# Patient Record
Sex: MALE | Race: WHITE | NOT HISPANIC OR LATINO | URBAN - METROPOLITAN AREA
[De-identification: names, ages, dates, MRNs, and addresses within clinical notes are randomized per-mention and may not be internally consistent; named-entity substitution may affect disease eponyms.]

---

## 2021-04-15 DIAGNOSIS — Z23 ENCOUNTER FOR IMMUNIZATION: ICD-10-CM

## 2021-04-26 ENCOUNTER — IMMUNIZATIONS (OUTPATIENT)
Dept: FAMILY MEDICINE CLINIC | Facility: HOSPITAL | Age: 36
End: 2021-04-26

## 2021-04-26 DIAGNOSIS — Z23 ENCOUNTER FOR IMMUNIZATION: Primary | ICD-10-CM

## 2021-04-26 PROCEDURE — 91301 SARS-COV-2 / COVID-19 MRNA VACCINE (MODERNA) 100 MCG: CPT

## 2021-04-26 PROCEDURE — 0011A SARS-COV-2 / COVID-19 MRNA VACCINE (MODERNA) 100 MCG: CPT

## 2021-05-24 ENCOUNTER — IMMUNIZATIONS (OUTPATIENT)
Dept: FAMILY MEDICINE CLINIC | Facility: HOSPITAL | Age: 36
End: 2021-05-24

## 2021-05-24 DIAGNOSIS — Z23 ENCOUNTER FOR IMMUNIZATION: Primary | ICD-10-CM

## 2021-05-24 PROCEDURE — 91301 SARS-COV-2 / COVID-19 MRNA VACCINE (MODERNA) 100 MCG: CPT

## 2021-05-24 PROCEDURE — 0012A SARS-COV-2 / COVID-19 MRNA VACCINE (MODERNA) 100 MCG: CPT

## 2021-09-20 ENCOUNTER — ANESTHESIA EVENT (OUTPATIENT)
Dept: OPERATING ROOM | Facility: HOSPITAL | Age: 36
Setting detail: SURGERY ADMIT
End: 2021-09-20
Payer: COMMERCIAL

## 2021-11-26 ENCOUNTER — ANESTHESIA (OUTPATIENT)
Dept: OPERATING ROOM | Facility: HOSPITAL | Age: 36
Setting detail: SURGERY ADMIT
End: 2021-11-26
Payer: COMMERCIAL

## 2022-02-03 ENCOUNTER — TRANSCRIBE ORDERS (OUTPATIENT)
Dept: SCHEDULING | Age: 37
End: 2022-02-03

## 2022-02-03 DIAGNOSIS — Z01.812 ENCOUNTER FOR PREPROCEDURAL LABORATORY EXAMINATION: Primary | ICD-10-CM

## 2022-02-10 ENCOUNTER — APPOINTMENT (OUTPATIENT)
Dept: LAB | Facility: HOSPITAL | Age: 37
End: 2022-02-10
Attending: DENTIST
Payer: COMMERCIAL

## 2022-02-10 DIAGNOSIS — Z01.812 ENCOUNTER FOR PREPROCEDURAL LABORATORY EXAMINATION: ICD-10-CM

## 2022-02-10 LAB — SARS-COV-2 RNA RESP QL NAA+PROBE: NEGATIVE

## 2022-02-10 PROCEDURE — U0003 INFECTIOUS AGENT DETECTION BY NUCLEIC ACID (DNA OR RNA); SEVERE ACUTE RESPIRATORY SYNDROME CORONAVIRUS 2 (SARS-COV-2) (CORONAVIRUS DISEASE [COVID-19]), AMPLIFIED PROBE TECHNIQUE, MAKING USE OF HIGH THROUGHPUT TECHNOLOGIES AS DESCRIBED BY CMS-2020-01-R: HCPCS

## 2022-02-10 PROCEDURE — C9803 HOPD COVID-19 SPEC COLLECT: HCPCS

## 2022-02-11 ENCOUNTER — APPOINTMENT (OUTPATIENT)
Dept: PREADMISSION TESTING | Facility: HOSPITAL | Age: 37
End: 2022-02-11
Payer: COMMERCIAL

## 2022-02-11 VITALS — WEIGHT: 198 LBS | BODY MASS INDEX: 25.41 KG/M2 | HEIGHT: 74 IN

## 2022-02-11 RX ORDER — ESCITALOPRAM OXALATE 10 MG/1
10 TABLET ORAL DAILY
COMMUNITY

## 2022-02-11 RX ORDER — CETIRIZINE HYDROCHLORIDE 10 MG/1
10 TABLET ORAL NIGHTLY
COMMUNITY

## 2022-02-11 ASSESSMENT — PAIN SCALES - GENERAL: PAINLEVEL: 0-NO PAIN

## 2022-02-11 NOTE — PRE-PROCEDURE INSTRUCTIONS
1. Admissions will call you with your arrival time on 2/14  (day prior to surgery) between 2pm - 4pm. For questions about your arrival time, please call [phone].    2. Please report to the [facility] in the [place] on the day of your procedure. Enter the hospital through the [place] [facility] (main entrance at [address], [place]). If you are parking in the [facility], come to the ground floor of the garage and follow signs to the [facility]. Bring your insurance card and photo ID.     3. Please follow these fasting guidelines:  - STOP all solid food 8 hours prior to arrival.   - No more than 12 oz of water is permitted and must STOP 2 HOURS prior to arrival to the hospital.     4. Early on the morning of the procedure, please take the following medications listed below with a sip of water, in addition to any medications prescribed by your surgeon:  Take escitalopram. May take tylenol if needed.     *NO aspirin, ibuprofen, anti-inflammatories, fish oil or Vitamin E unless ordered by physician.    *Stop taking      5. Other instructions: antibacterial shower x 2 dial gold , brush teeth    6. If you develop a cough, cold, fever, or other symptom prior to the date of the procedure, please report it to your physician immediately.    7. If you need to cancel the procedure for any reason, please contact your physician.    8. Make arrangements to have someone drive you home from the procedure. If you have not arranged for transportation home, your surgery may be cancelled.     9. You may not take public transportation or a cab unless accompanied by a responsible person.    10. You may not drive a car or operate complex or potentially dangerous machinery for 24 hours following anesthesia and/or sedation.    11. If it is medically necessary for you to have a longer stay, you will be informed as soon as the decision is made.    12. Do not wear or bring anything of value to the  [facility], including medication or jewelry of any kind. Do not wear make-up or contact lenses. Do bring your glasses and hearing aid, with a case. If you use a CPAP machine and will be overnight, please bring it with you. If you use any inhalers, please bring them as well.     13. Dress in comfortable clothes.    14. If instructed, please bring a copy of your Advance Directive (Living Will/Durable Power of ) on the day of your procedure.    15. Ensuring your safety at all times is a very important part of our [facility] Culture of Safety. After having surgery and sedation, you are at risk for falling and balance issues. Although you may feel awake, the effects of the medication can last up to 24 hours after anesthesia. If you need to use the bathroom during your recovery period, nursing staff will escort you there and stay with you to ensure your safety.    Pre operative instructions given as per protocol.  Form explained by:        Verbalized understanding and denies further questions.

## 2022-02-11 NOTE — NURSING NOTE
Reviewed hx of negative cardiac workup 2016 for palpitations, and pt states he has vasovagal syncope when dehydrated and also that he if prone to passing out during venipunctures with Dr ANNABEL Hollins , no EKG needed.

## 2022-02-14 ASSESSMENT — ENCOUNTER SYMPTOMS: DYSRHYTHMIAS: 1

## 2022-02-14 NOTE — ANESTHESIOLOGIST PRE-PROCEDURE CHART REVIEW
I confirm that I have reviewed the available information in the medical record prior to the scheduled surgery. No labs on chart

## 2022-02-14 NOTE — ANESTHESIA PREPROCEDURE EVALUATION
Relevant Problems   No relevant active problems       Anesthesia ROS/MED HX    Anesthesia History - neg  Pulmonary - neg  Neuro/Psych    Anxiety  Cardiovascular  Dysrhythmias (Palpitations/tachycardia 2016, cardiac workup negative)   Covid19 Test Reviewed  Hematological - neg  GI/Hepatic- neg  Musculoskeletal- neg  Renal Disease- neg  Endo/Other  Body Habitus: Overweight  ROS/MED HX Comments:    Cardiology: Hx vasovagal syncope       Past Surgical History:   Procedure Laterality Date   • WISDOM TOOTH EXTRACTION         Physical Exam    Airway   Mallampati: I   TM distance: >3 FB   Neck ROM: full  Cardiovascular - normal   Rhythm: regular   Rate: normalPulmonary - normal   clear to auscultation  Dental - normal    Anesthesia  Exam Comments:   Dental: Full braces        Anesthesia Plan    Plan: general    Technique: general endotracheal     Lines and Monitors: PIV     Airway: nasal intubation   ASA 2  Blood Products:     Use of Blood Products Discussed: Yes   Anesthetic plan and risks discussed with: patient  Induction:    intravenous   Postop Plan:   Patient Disposition: ICU planned admission   Pain Management: IV analgesics

## 2022-02-15 ENCOUNTER — HOSPITAL ENCOUNTER (INPATIENT)
Facility: HOSPITAL | Age: 37
LOS: 3 days | Discharge: HOME | End: 2022-02-18
Attending: DENTIST | Admitting: DENTIST
Payer: COMMERCIAL

## 2022-02-15 PROBLEM — M26.02 MAXILLARY HYPOPLASIA: Status: ACTIVE | Noted: 2022-02-15

## 2022-02-15 PROBLEM — F41.9 ANXIETY: Status: ACTIVE | Noted: 2022-02-15

## 2022-02-15 LAB
ABO + RH BLD: NORMAL
ABO + RH BLD: NORMAL
BLD GP AB SCN SERPL QL: NEGATIVE
D AG BLD QL: POSITIVE
D AG BLD QL: POSITIVE
LABORATORY COMMENT REPORT: NORMAL
LABORATORY COMMENT REPORT: NORMAL
SPECIMEN EXP DATE BLD: NORMAL

## 2022-02-15 PROCEDURE — 37000001 HC ANESTHESIA GENERAL: Performed by: DENTIST

## 2022-02-15 PROCEDURE — C1758 CATHETER, URETERAL: HCPCS | Performed by: DENTIST

## 2022-02-15 PROCEDURE — 25800000 HC PHARMACY IV SOLUTIONS: Performed by: STUDENT IN AN ORGANIZED HEALTH CARE EDUCATION/TRAINING PROGRAM

## 2022-02-15 PROCEDURE — 25000000 HC PHARMACY GENERAL: Performed by: ANESTHESIOLOGY

## 2022-02-15 PROCEDURE — 36000014 HC OR LEVEL 4 EA ADDL MIN: Performed by: DENTIST

## 2022-02-15 PROCEDURE — 25000000 HC PHARMACY GENERAL: Performed by: DENTIST

## 2022-02-15 PROCEDURE — 25800000 HC PHARMACY IV SOLUTIONS: Performed by: ANESTHESIOLOGY

## 2022-02-15 PROCEDURE — 63600000 HC DRUGS/DETAIL CODE: Performed by: ANESTHESIOLOGY

## 2022-02-15 PROCEDURE — 36415 COLL VENOUS BLD VENIPUNCTURE: CPT | Performed by: DENTIST

## 2022-02-15 PROCEDURE — 71000001 HC PACU PHASE 1 INITIAL 30MIN: Performed by: DENTIST

## 2022-02-15 PROCEDURE — 25800000 HC PHARMACY IV SOLUTIONS: Performed by: DENTIST

## 2022-02-15 PROCEDURE — C1713 ANCHOR/SCREW BN/BN,TIS/BN: HCPCS | Performed by: DENTIST

## 2022-02-15 PROCEDURE — 86901 BLOOD TYPING SEROLOGIC RH(D): CPT

## 2022-02-15 PROCEDURE — 63600000 HC DRUGS/DETAIL CODE: Performed by: STUDENT IN AN ORGANIZED HEALTH CARE EDUCATION/TRAINING PROGRAM

## 2022-02-15 PROCEDURE — 0NSV04Z REPOSITION LEFT MANDIBLE WITH INTERNAL FIXATION DEVICE, OPEN APPROACH: ICD-10-PCS | Performed by: DENTIST

## 2022-02-15 PROCEDURE — 94640 AIRWAY INHALATION TREATMENT: CPT

## 2022-02-15 PROCEDURE — 0NSR04Z REPOSITION MAXILLA WITH INTERNAL FIXATION DEVICE, OPEN APPROACH: ICD-10-PCS | Performed by: DENTIST

## 2022-02-15 PROCEDURE — 27200000 HC STERILE SUPPLY: Performed by: DENTIST

## 2022-02-15 PROCEDURE — 20000000 HC ROOM AND CARE ICU

## 2022-02-15 PROCEDURE — 63700000 HC SELF-ADMINISTRABLE DRUG: Performed by: DENTIST

## 2022-02-15 PROCEDURE — 36000004 HC OR LEVEL 4 INITIAL 30MIN: Performed by: DENTIST

## 2022-02-15 PROCEDURE — 71000011 HC PACU PHASE 1 EA ADDL MIN: Performed by: DENTIST

## 2022-02-15 PROCEDURE — 0NST04Z REPOSITION RIGHT MANDIBLE WITH INTERNAL FIXATION DEVICE, OPEN APPROACH: ICD-10-PCS | Performed by: DENTIST

## 2022-02-15 PROCEDURE — 09SM0ZZ REPOSITION NASAL SEPTUM, OPEN APPROACH: ICD-10-PCS | Performed by: DENTIST

## 2022-02-15 DEVICE — IMPLANTABLE DEVICE: Type: IMPLANTABLE DEVICE | Site: MANDIBLE | Status: FUNCTIONAL

## 2022-02-15 RX ORDER — LIDOCAINE HYDROCHLORIDE 10 MG/ML
INJECTION, SOLUTION INFILTRATION; PERINEURAL AS NEEDED
Status: DISCONTINUED | OUTPATIENT
Start: 2022-02-15 | End: 2022-02-15 | Stop reason: SURG

## 2022-02-15 RX ORDER — GLYCOPYRROLATE 0.6MG/3ML
SYRINGE (ML) INTRAVENOUS AS NEEDED
Status: DISCONTINUED | OUTPATIENT
Start: 2022-02-15 | End: 2022-02-15 | Stop reason: SURG

## 2022-02-15 RX ORDER — DEXAMETHASONE SODIUM PHOSPHATE 4 MG/ML
INJECTION, SOLUTION INTRA-ARTICULAR; INTRALESIONAL; INTRAMUSCULAR; INTRAVENOUS; SOFT TISSUE AS NEEDED
Status: DISCONTINUED | OUTPATIENT
Start: 2022-02-15 | End: 2022-02-15

## 2022-02-15 RX ORDER — DEXMEDETOMIDINE HYDROCHLORIDE 100 UG/ML
INJECTION, SOLUTION INTRAVENOUS AS NEEDED
Status: DISCONTINUED | OUTPATIENT
Start: 2022-02-15 | End: 2022-02-15 | Stop reason: SURG

## 2022-02-15 RX ORDER — ONDANSETRON HYDROCHLORIDE 2 MG/ML
INJECTION, SOLUTION INTRAVENOUS AS NEEDED
Status: DISCONTINUED | OUTPATIENT
Start: 2022-02-15 | End: 2022-02-15 | Stop reason: SURG

## 2022-02-15 RX ORDER — EPHEDRINE SULFATE 50 MG/ML
INJECTION, SOLUTION INTRAVENOUS AS NEEDED
Status: DISCONTINUED | OUTPATIENT
Start: 2022-02-15 | End: 2022-02-15 | Stop reason: SURG

## 2022-02-15 RX ORDER — OXYCODONE HYDROCHLORIDE 5 MG/1
5 TABLET ORAL EVERY 4 HOURS PRN
Status: DISCONTINUED | OUTPATIENT
Start: 2022-02-15 | End: 2022-02-15

## 2022-02-15 RX ORDER — DEXAMETHASONE SODIUM PHOSPHATE 4 MG/ML
2 INJECTION, SOLUTION INTRA-ARTICULAR; INTRALESIONAL; INTRAMUSCULAR; INTRAVENOUS; SOFT TISSUE
Status: COMPLETED | OUTPATIENT
Start: 2022-02-17 | End: 2022-02-17

## 2022-02-15 RX ORDER — SODIUM CHLORIDE 9 MG/ML
INJECTION, SOLUTION INTRAVENOUS CONTINUOUS PRN
Status: DISCONTINUED | OUTPATIENT
Start: 2022-02-15 | End: 2022-02-15 | Stop reason: SURG

## 2022-02-15 RX ORDER — SODIUM CHLORIDE 9 MG/ML
INJECTION, SOLUTION INTRAVENOUS CONTINUOUS
Status: DISCONTINUED | OUTPATIENT
Start: 2022-02-15 | End: 2022-02-17

## 2022-02-15 RX ORDER — FENTANYL CITRATE 50 UG/ML
INJECTION, SOLUTION INTRAMUSCULAR; INTRAVENOUS AS NEEDED
Status: DISCONTINUED | OUTPATIENT
Start: 2022-02-15 | End: 2022-02-15 | Stop reason: SURG

## 2022-02-15 RX ORDER — HYDROMORPHONE HYDROCHLORIDE 1 MG/ML
0.5 INJECTION, SOLUTION INTRAMUSCULAR; INTRAVENOUS; SUBCUTANEOUS
Status: DISCONTINUED | OUTPATIENT
Start: 2022-02-15 | End: 2022-02-15 | Stop reason: HOSPADM

## 2022-02-15 RX ORDER — DEXAMETHASONE SODIUM PHOSPHATE 4 MG/ML
8 INJECTION, SOLUTION INTRA-ARTICULAR; INTRALESIONAL; INTRAMUSCULAR; INTRAVENOUS; SOFT TISSUE
Status: COMPLETED | OUTPATIENT
Start: 2022-02-15 | End: 2022-02-16

## 2022-02-15 RX ORDER — MIDAZOLAM HYDROCHLORIDE 2 MG/2ML
INJECTION, SOLUTION INTRAMUSCULAR; INTRAVENOUS AS NEEDED
Status: DISCONTINUED | OUTPATIENT
Start: 2022-02-15 | End: 2022-02-15 | Stop reason: SURG

## 2022-02-15 RX ORDER — IBUPROFEN 200 MG
16-32 TABLET ORAL AS NEEDED
Status: DISCONTINUED | OUTPATIENT
Start: 2022-02-15 | End: 2022-02-18

## 2022-02-15 RX ORDER — DEXTROSE MONOHYDRATE AND SODIUM CHLORIDE 5; .45 G/100ML; G/100ML
100 INJECTION, SOLUTION INTRAVENOUS CONTINUOUS
Status: DISCONTINUED | OUTPATIENT
Start: 2022-02-15 | End: 2022-02-16

## 2022-02-15 RX ORDER — PHENYLEPHRINE HYDROCHLORIDE 10 MG/ML
INJECTION INTRAVENOUS AS NEEDED
Status: DISCONTINUED | OUTPATIENT
Start: 2022-02-15 | End: 2022-02-15 | Stop reason: SURG

## 2022-02-15 RX ORDER — LABETALOL HCL 20 MG/4 ML
SYRINGE (ML) INTRAVENOUS AS NEEDED
Status: DISCONTINUED | OUTPATIENT
Start: 2022-02-15 | End: 2022-02-15 | Stop reason: SURG

## 2022-02-15 RX ORDER — FENTANYL CITRATE 50 UG/ML
50 INJECTION, SOLUTION INTRAMUSCULAR; INTRAVENOUS
Status: DISCONTINUED | OUTPATIENT
Start: 2022-02-15 | End: 2022-02-15 | Stop reason: HOSPADM

## 2022-02-15 RX ORDER — ROCURONIUM BROMIDE 10 MG/ML
INJECTION, SOLUTION INTRAVENOUS AS NEEDED
Status: DISCONTINUED | OUTPATIENT
Start: 2022-02-15 | End: 2022-02-15 | Stop reason: SURG

## 2022-02-15 RX ORDER — PROPOFOL 10 MG/ML
INJECTION, EMULSION INTRAVENOUS AS NEEDED
Status: DISCONTINUED | OUTPATIENT
Start: 2022-02-15 | End: 2022-02-15 | Stop reason: SURG

## 2022-02-15 RX ORDER — SODIUM CHLORIDE, SODIUM GLUCONATE, SODIUM ACETATE, POTASSIUM CHLORIDE AND MAGNESIUM CHLORIDE 30; 37; 368; 526; 502 MG/100ML; MG/100ML; MG/100ML; MG/100ML; MG/100ML
INJECTION, SOLUTION INTRAVENOUS CONTINUOUS PRN
Status: DISCONTINUED | OUTPATIENT
Start: 2022-02-15 | End: 2022-02-15 | Stop reason: SURG

## 2022-02-15 RX ORDER — DEXTROSE 40 %
15-30 GEL (GRAM) ORAL AS NEEDED
Status: DISCONTINUED | OUTPATIENT
Start: 2022-02-15 | End: 2022-02-18

## 2022-02-15 RX ORDER — AMOXICILLIN 250 MG
1 CAPSULE ORAL 2 TIMES DAILY
Status: DISCONTINUED | OUTPATIENT
Start: 2022-02-15 | End: 2022-02-18

## 2022-02-15 RX ORDER — CHLORHEXIDINE GLUCONATE ORAL RINSE 1.2 MG/ML
SOLUTION DENTAL
Status: DISCONTINUED | OUTPATIENT
Start: 2022-02-15 | End: 2022-02-15 | Stop reason: HOSPADM

## 2022-02-15 RX ORDER — HYDROMORPHONE HYDROCHLORIDE 1 MG/ML
0.4 INJECTION, SOLUTION INTRAMUSCULAR; INTRAVENOUS; SUBCUTANEOUS
Status: DISCONTINUED | OUTPATIENT
Start: 2022-02-15 | End: 2022-02-18

## 2022-02-15 RX ORDER — ONDANSETRON HYDROCHLORIDE 2 MG/ML
4 INJECTION, SOLUTION INTRAVENOUS
Status: DISCONTINUED | OUTPATIENT
Start: 2022-02-15 | End: 2022-02-15 | Stop reason: HOSPADM

## 2022-02-15 RX ORDER — DEXTROSE 50 % IN WATER (D50W) INTRAVENOUS SYRINGE
25 AS NEEDED
Status: DISCONTINUED | OUTPATIENT
Start: 2022-02-15 | End: 2022-02-18

## 2022-02-15 RX ORDER — DEXAMETHASONE SODIUM PHOSPHATE 4 MG/ML
4 INJECTION, SOLUTION INTRA-ARTICULAR; INTRALESIONAL; INTRAMUSCULAR; INTRAVENOUS; SOFT TISSUE
Status: COMPLETED | OUTPATIENT
Start: 2022-02-17 | End: 2022-02-17

## 2022-02-15 RX ORDER — TRIPROLIDINE/PSEUDOEPHEDRINE 2.5MG-60MG
600 TABLET ORAL EVERY 6 HOURS PRN
Status: DISCONTINUED | OUTPATIENT
Start: 2022-02-15 | End: 2022-02-18

## 2022-02-15 RX ORDER — LIDOCAINE HCL/EPINEPHRINE/PF 2%-1:200K
VIAL (ML) INJECTION
Status: DISCONTINUED | OUTPATIENT
Start: 2022-02-15 | End: 2022-02-15 | Stop reason: HOSPADM

## 2022-02-15 RX ORDER — DEXAMETHASONE SODIUM PHOSPHATE 4 MG/ML
6 INJECTION, SOLUTION INTRA-ARTICULAR; INTRALESIONAL; INTRAMUSCULAR; INTRAVENOUS; SOFT TISSUE
Status: COMPLETED | OUTPATIENT
Start: 2022-02-16 | End: 2022-02-16

## 2022-02-15 RX ADMIN — DEXAMETHASONE SODIUM PHOSPHATE 8 MG: 4 INJECTION, SOLUTION INTRA-ARTICULAR; INTRALESIONAL; INTRAMUSCULAR; INTRAVENOUS; SOFT TISSUE at 15:27

## 2022-02-15 RX ADMIN — Medication 0.4 MG: at 09:26

## 2022-02-15 RX ADMIN — LABETALOL HYDROCHLORIDE 5 MG: 5 INJECTION, SOLUTION INTRAVENOUS at 09:04

## 2022-02-15 RX ADMIN — PHENYLEPHRINE HYDROCHLORIDE 50 MCG: 10 INJECTION INTRAVENOUS at 11:28

## 2022-02-15 RX ADMIN — ROCURONIUM BROMIDE 20 MG: 10 INJECTION, SOLUTION INTRAVENOUS at 10:44

## 2022-02-15 RX ADMIN — SODIUM CHLORIDE, SODIUM GLUCONATE, SODIUM ACETATE, POTASSIUM CHLORIDE AND MAGNESIUM CHLORIDE: 526; 502; 368; 37; 30 INJECTION, SOLUTION INTRAVENOUS at 07:50

## 2022-02-15 RX ADMIN — PHENYLEPHRINE HYDROCHLORIDE 50 MCG: 10 INJECTION INTRAVENOUS at 09:56

## 2022-02-15 RX ADMIN — EPHEDRINE SULFATE 10 MG: 50 INJECTION, SOLUTION INTRAVENOUS at 08:19

## 2022-02-15 RX ADMIN — LIDOCAINE HYDROCHLORIDE 4 ML: 10 INJECTION, SOLUTION INFILTRATION; PERINEURAL at 07:41

## 2022-02-15 RX ADMIN — PHENYLEPHRINE HYDROCHLORIDE 50 MCG: 10 INJECTION INTRAVENOUS at 12:10

## 2022-02-15 RX ADMIN — EPHEDRINE SULFATE 2.5 MG: 50 INJECTION, SOLUTION INTRAVENOUS at 09:12

## 2022-02-15 RX ADMIN — ROCURONIUM BROMIDE 10 MG: 10 INJECTION, SOLUTION INTRAVENOUS at 09:32

## 2022-02-15 RX ADMIN — PROPOFOL 200 MG: 10 INJECTION, EMULSION INTRAVENOUS at 07:41

## 2022-02-15 RX ADMIN — PHENYLEPHRINE HYDROCHLORIDE 50 MCG: 10 INJECTION INTRAVENOUS at 10:29

## 2022-02-15 RX ADMIN — HYDROMORPHONE HYDROCHLORIDE 0.4 MG: 1 INJECTION, SOLUTION INTRAMUSCULAR; INTRAVENOUS; SUBCUTANEOUS at 19:54

## 2022-02-15 RX ADMIN — ROCURONIUM BROMIDE 10 MG: 10 INJECTION, SOLUTION INTRAVENOUS at 09:56

## 2022-02-15 RX ADMIN — FENTANYL CITRATE 25 MCG: 50 INJECTION, SOLUTION INTRAMUSCULAR; INTRAVENOUS at 13:55

## 2022-02-15 RX ADMIN — SODIUM CHLORIDE: 0.9 INJECTION, SOLUTION INTRAVENOUS at 07:33

## 2022-02-15 RX ADMIN — ROCURONIUM BROMIDE 50 MG: 10 INJECTION, SOLUTION INTRAVENOUS at 07:43

## 2022-02-15 RX ADMIN — PHENYLEPHRINE HYDROCHLORIDE 100 MCG: 10 INJECTION INTRAVENOUS at 10:33

## 2022-02-15 RX ADMIN — PHENYLEPHRINE HYDROCHLORIDE 100 MCG: 10 INJECTION INTRAVENOUS at 10:12

## 2022-02-15 RX ADMIN — CEFAZOLIN 1 G: 330 INJECTION, POWDER, FOR SOLUTION INTRAMUSCULAR; INTRAVENOUS at 12:00

## 2022-02-15 RX ADMIN — FENTANYL CITRATE 25 MCG: 50 INJECTION, SOLUTION INTRAMUSCULAR; INTRAVENOUS at 12:29

## 2022-02-15 RX ADMIN — ROCURONIUM BROMIDE 30 MG: 10 INJECTION, SOLUTION INTRAVENOUS at 08:43

## 2022-02-15 RX ADMIN — FENTANYL CITRATE 100 MCG: 50 INJECTION, SOLUTION INTRAMUSCULAR; INTRAVENOUS at 07:41

## 2022-02-15 RX ADMIN — PHENYLEPHRINE HYDROCHLORIDE 50 MCG: 10 INJECTION INTRAVENOUS at 11:23

## 2022-02-15 RX ADMIN — PHENYLEPHRINE HYDROCHLORIDE 100 MCG: 10 INJECTION INTRAVENOUS at 10:48

## 2022-02-15 RX ADMIN — DEXMEDETOMIDINE HYDROCHLORIDE 30 MCG: 100 INJECTION, SOLUTION INTRAVENOUS at 08:25

## 2022-02-15 RX ADMIN — AMPICILLIN SODIUM AND SULBACTAM SODIUM 3 G: 2; 1 INJECTION, POWDER, FOR SOLUTION INTRAMUSCULAR; INTRAVENOUS at 16:29

## 2022-02-15 RX ADMIN — CEFAZOLIN 2 G: 330 INJECTION, POWDER, FOR SOLUTION INTRAMUSCULAR; INTRAVENOUS at 08:05

## 2022-02-15 RX ADMIN — DEXAMETHASONE SODIUM PHOSPHATE 8 MG: 4 INJECTION, SOLUTION INTRA-ARTICULAR; INTRALESIONAL; INTRAMUSCULAR; INTRAVENOUS; SOFT TISSUE at 20:07

## 2022-02-15 RX ADMIN — ROCURONIUM BROMIDE 20 MG: 10 INJECTION, SOLUTION INTRAVENOUS at 11:44

## 2022-02-15 RX ADMIN — MIDAZOLAM HYDROCHLORIDE 2 MG: 1 INJECTION, SOLUTION INTRAMUSCULAR; INTRAVENOUS at 07:33

## 2022-02-15 RX ADMIN — ONDANSETRON 4 MG: 2 INJECTION INTRAMUSCULAR; INTRAVENOUS at 12:15

## 2022-02-15 RX ADMIN — DEXMEDETOMIDINE HYDROCHLORIDE 5 MCG: 100 INJECTION, SOLUTION INTRAVENOUS at 11:57

## 2022-02-15 RX ADMIN — SUGAMMADEX 200 MG: 100 INJECTION, SOLUTION INTRAVENOUS at 12:33

## 2022-02-15 RX ADMIN — LABETALOL HYDROCHLORIDE 5 MG: 5 INJECTION, SOLUTION INTRAVENOUS at 09:01

## 2022-02-15 RX ADMIN — PHENYLEPHRINE HYDROCHLORIDE 100 MCG: 10 INJECTION INTRAVENOUS at 10:56

## 2022-02-15 RX ADMIN — FENTANYL CITRATE 25 MCG: 50 INJECTION, SOLUTION INTRAMUSCULAR; INTRAVENOUS at 13:32

## 2022-02-15 RX ADMIN — AMPICILLIN SODIUM AND SULBACTAM SODIUM 3 G: 2; 1 INJECTION, POWDER, FOR SOLUTION INTRAMUSCULAR; INTRAVENOUS at 20:22

## 2022-02-15 RX ADMIN — DEXTROSE AND SODIUM CHLORIDE 100 ML/HR: 5; 450 INJECTION, SOLUTION INTRAVENOUS at 14:57

## 2022-02-15 RX ADMIN — DEXAMETHASONE SODIUM PHOSPHATE 10 MG: 4 INJECTION, SOLUTION INTRAMUSCULAR; INTRAVENOUS at 08:03

## 2022-02-15 NOTE — OP NOTE
Pre op Dx: Dentofacial deformity and malocclusion, Maxillary Hyperplasia, mandibular hypoplasia.    Post op Dx: Same    Anes: GNTA    Surgeon: Aleksandr Tracy, NIKHIL Chavez MD, DMD    Procedure: Lefort I midface osteotomy with advancement and impaction, Nasal Septoplasty, Bilateral Inferior Turbinectomy, Bilateral Sagital Split Mandibular Osteotomy    EBL: 350 cc    IVF: 2700 cc    Urine: 500 cc    Spec: None    Procedure in detail:    Patient was taken to the operating room and placed on the operating room table in the supine position.  After adequate induction of general anesthesia the patient was prepped and draped in the usual sterile fashion for maxillofacial procedure.  At this point the bilateral alar bases were marked utilizing a 4-0 silk suture and the alar base width was marked as 36 mm preoperatively.  Attention was directed intraorally where the mouth and oropharynx were thoroughly suctioned and a moist throat pack was placed.  Utilizing 2% Xylocaine with 1 100,000 epinephrine the patient was injected via bilateral V3 block and buccal and palatal infiltrations into the maxilla as well as into the anterior mandible.    Utilizing the Bovie electrocautery, an incision was made over the external oblique ridge on the left mandible carried anteriorly to the distal aspect of tooth #18.  A vertical release was then made on the distal aspect of tooth #20.  Utilizing a periosteal elevator the tissues were raised in a subperiosteal plane to expose the lateral aspect of the mandible down to the inferior border.  The dissection continued in a subperiosteal plane on the medial aspect of the mandibular ramus.  Section continued up the ramus utilizing a notch stripper where the fibers of the temporalis muscle insertion on the coronoid process per lysed utilizing the Bovie electrocautery.  A curved Kocher and chain were placed as a retractor on the coronoid process.  At this point the inferior alveolar  nerve was identified entering the mandibular foramen on the medial aspect of the ramus.  A horizontal monocortical bony cut was made utilizing a reciprocating saw at this point.  The reciprocating saw was then used to make a vertical cut in a monocortical fashion on the buccal cortex of the left mandible in the first molar region.  2 previously mentioned bony cuts were then connected utilizing a sagittal saw.  At this point the surgical site was packed with Surgicel.    Utilizing the Bovie electrocautery, an incision was made over the external oblique ridge on the right mandible carried anteriorly to the distal aspect of tooth # 31.  A vertical release was then made on the distal aspect of tooth # 29.  Utilizing a periosteal elevator the tissues were raised in a subperiosteal plane to expose the lateral aspect of the mandible down to the inferior border.  The dissection continued in a subperiosteal plane on the medial aspect of the mandibular ramus.  Section continued up the ramus utilizing a notch stripper where the fibers of the temporalis muscle insertion on the coronoid process per lysed utilizing the Bovie electrocautery.  A curved Kocher and chain were placed as a retractor on the coronoid process.  At this point the inferior alveolar nerve was identified entering the mandibular foramen on the medial aspect of the ramus.  A horizontal monocortical bony cut was made utilizing a reciprocating saw at this point.  The reciprocating saw was then used to make a vertical cut in a monocortical fashion on the buccal cortex of the right mandible in the first molar region.  2 previously mentioned bony cuts were then connected utilizing a sagittal saw.  At this point the surgical site was packed with Surgicel.    Attention was then directed to the maxilla where utilizing the Bovie electrocautery a vestibular incision was made from first premolar to first premolar on the opposite side.  The incision was continued down  through periosteum.  The tissues were then raised in a subperiosteal plane to expose the entire anterior face of the maxilla.  Dissection continued around the piriform aperture raising the nasal mucosa off of the surrounding nasal floor, lateral nasal walls.  The dissection continued in a subperiosteal plane around the zygomaticomaxillary buttresses bilaterally to the pterygoid plates.  At this point utilizing the reciprocating saw a horizontal cut was made through the bilateral zygomaticomaxillary buttresses across the anterior face of the maxilla through the piriform aperture approximately 5 mm superior to the nasal floor.  Utilizing a nasal septal osteotome, the nasal septum was osteotomized from the nasal floor.  A single ball osteotome was then used to osteotomize the lateral nasal walls.  A curved pterygoid chisel was then used to perform the pterygomaxillary disjunction.  At this point the maxilla was down fractured utilizing manual finger pressure.  Maxilla was then mobilized utilizing Sanches disimpaction forceps.  The bony nasal floor midline was flattened utilizing a rongeur.  The lateral nasal walls were also removed from the floor of the maxilla utilizing the rongeur. A submucous septoplasty was performed with a #15 blde to ensure passive fit to the nasal floor with no deflection of the septum. An inferior turbinectomy was performed by resection of the inferior aspect of the inferior turbinate with the bovie electrocautery and the bone of the turbinate was trimmed with a rongeur.  There were several small tears noted in the nasal mucosa on the both sides.  This mucosa was repaired with 4-0 chromic suture in an interrupted fashion. An enameloplasty was performed with the drill and a round bur to teeth #4, 13 palatal cusps and buccal cusp of #20.  At this point the intermediate splint was then wired to between the maxilla and the mandible. A 4mm segment of bone was removed from the midface just superior to  the cut to allow adequate impaction of the maxilla. All bony interferences were removed with a rongeur and the impaction measurement was confirmed.    The maxilla was then secured with rigid fixation utilizing 4 L-shaped bone plates, 2.0 mm KLS Kyree with multiple screws.  At this point the maxillomandibular fixation was removed.  Attention was then directed back down to the mandibular osteotomy cuts where utilizing various osteotomes and chisels the above-mentioned mandibular cuts were then completed.  The bilateral sagittal split osteotomy was then completed with a Roche .  It should be noted that the inferior alveolar nerves were completely intact bilaterally.  At this point, the final surgical splint was then wired to the maxilla with 26-gauge stainless steel surgical wire.  The patient was then placed into maxillomandibular fixation utilizing 26-gauge stainless steel surgical wire.  Attention was directed to the patient's left side where utilizing the [place] bone clamp, the proximal segment was secured and clamped to the distal segment after seating the condyle into the fossa.  A #11 blade was utilized to make a small stab incision in the left cheek.  The transbuccal trocar and cheek retractor was then used to place a total of three 2.0 mm KLS Kyree bicortical screws to secure the mandible. Attention was directed to the patient's right side where utilizing the [place] bone clamp, the proximal segment was secured and clamped to the distal segment after seating the condyle into the fossa.  A #11 blade was utilized to make a small stab incision in the right cheek.  The transbuccal trocar and cheek retractor was then used to place a total of three 2.0 mm KLS Kyree bicortical screws to secure the mandible. The condyle was then seated into its proper position and 3 screws were placed to rigidly fixate the mandible on the right side.    At this point the maxillomandibular fixation was removed and the  dental occlusion was checked and found to be excellent and repeatable. The nasal septum was secured to the midline of the Anterior Nasal Spine with 2-0 PDS. Attention was then directed to the maxilla where an alar base cinch suture was utilized to reestablish the alar base width.  This area was secured with 2-0 PDS suture.  The maxillary incision was then closed with 3-0 chromic suture in a VY fashion.  These wounds were then closed with multiple interrupted 3-0 chromic sutures.  The external facial incisions on the cheeks were then closed with interrupted 5-0 Prolene sutures.    The mouth and oropharynx were thoroughly suctioned out and a nasogastric tube was passed and suctioned and removed.  The maxilla and mandible were wired together in the surgical splint utilizing 26-gauge stainless steel surgical wire.  This marked the completion of the procedure. A jaw bra pressure dressing was then placed.  The patient was then awakened from the general anesthetic and extubated in the operating room.  She was then transferred to the postanesthesia care unit with stable vital signs and spontaneous ventilations.

## 2022-02-15 NOTE — ASSESSMENT & PLAN NOTE
S/p Maxillofacial surgery    Post surgical management w/ OMF  Post op abx, Unasyn course inpatient and transition to PO abx on d/c per OMF  Have wire cutters at bedside  S/p Dexamethasone taper. monitor  Monitor respiratory status and airway  Dilaudid PRN  Suction PRN  OOB/ambulation  Diet, CLD and advance diet per OMF recommendations  Appropriate dvt ppx

## 2022-02-15 NOTE — ANESTHESIA POSTPROCEDURE EVALUATION
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])    Surgeons: Aleksandr Tracy DMD Responsible Provider: Isaura Squires MD    Anesthesia Type: general ASA Status: 2          Anesthesia Type: general  PACU Vitals  2/15/2022 1305 - 2/15/2022 1318      2/15/2022  1310             BP: 126/60    Temp: 37.1 °C (98.8 °F)    Pulse: 97    Resp: 23    SpO2: 100 %            Anesthesia Post Evaluation    Pain management: satisfactory to patient  Mode of pain management: IV medication  Patient location during evaluation: PACU  Patient participation: complete - patient cannot participate  Level of consciousness: awake and arousable  Cardiovascular status: hemodynamically stable  Airway Patency: patent  Respiratory status: nonlabored ventilation, spontaneous ventilation and face mask  Hydration status: stable  Anesthetic complications: no

## 2022-02-15 NOTE — PROGRESS NOTES
Critical Care Progress Note    Hospital Day 1  Vent Day N/A    INTERVAL SUMMARY    Patient presenting to ICU after following procedure for dentofacial deformity/malocclusion, Maxillary hyperplasia, Mandibular hypoplasia:  Lefort I midface osteotomy with advancement and impaction, Nasal Septoplasty, Bilateral Inferior Turbinectomy, Bilateral Sagital Split Mandibular Osteotomy    Procedure uncomplicated. Patient admitted chiefly for airway monitoring.     On examination, patient responds by nodding.     Per nursing report, patient can undergo vasovagal syncope 2/2 anxiety.     OBJECTIVE   VITAL SIGNS  Temp:  [36.3 °C (97.4 °F)-37.1 °C (98.8 °F)] 36.5 °C (97.7 °F)  Heart Rate:  [77-98] 90  Resp:  [12-22] 14  BP: (121-130)/(58-75) 129/72  FiO2 (%) (Set):  [60 %-100 %] 60 %  FiO2 (%) (Set):  [60 %-100 %] 60 %    Intake/Output Summary (Last 24 hours) at 2/15/2022 1620  Last data filed at 2/15/2022 1500  Gross per 24 hour   Intake 2825 ml   Output 1250 ml   Net 1575 ml       O2 Requirement:  Oxygen Therapy: Supplemental oxygen  O2 Delivery Method: Face tent (humidified)  FiO2 (%) (Set): 60 %  O2 Flow Rate (L/min): 12 L/min       MEDICATIONS  ampicillin-sulbactam, 3 g, q6h INT  dexamethasone, 8 mg, q6h INT   Followed by  [START ON 2/16/2022] dexamethasone, 6 mg, q6h INT   Followed by  [START ON 2/17/2022] dexamethasone, 4 mg, q6h INT   Followed by  [START ON 2/17/2022] dexamethasone, 2 mg, q6h INT  sennosides-docusate sodium, 1 tablet, BID           LAB RESULTS  Recent Results (from the past 24 hour(s))   Type and Screen [facility]    Collection Time: 02/15/22  6:44 AM   Result Value Ref Range    Specimen Expiration 02/18/2022     Antibody Screen Negative     ABO A     Rh Factor Positive     History Check No type on file    Repeat ABO/Rh (Type Check)    Collection Time: 02/15/22  7:10 AM   Result Value Ref Range    ABO A     Rh Factor Positive     History Check Previous type on file      Laboratory results were independently  reviewed    ABG          CULTURES  Microbiology Results       Procedure Component Value Units Date/Time    COVID-19 PCR PAT/Pre-procedural [[id number]]  (Normal) Collected: 02/10/22 1032    Specimen: Nasopharyngeal Swab from Nasopharynx Updated: 02/10/22 1755    Narrative:      The following orders were created for panel order COVID-19 PCR PAT/Pre-procedural.  Procedure                               Abnormality         Status                     ---------                               -----------         ------                     SARS-CoV-2 (COVID-19), PCR[[id number]]   Normal              Final result                 Please view results for these tests on the individual orders.    SARS-CoV-2 (COVID-19), PCR [[id number]]  (Normal) Collected: 02/10/22 1032    Specimen: Nasopharyngeal Swab from Nasopharynx Updated: 02/10/22 1755     SARS-CoV-2 (COVID-19) Negative            Laboratory results were independently reviewed    RADIOLOGY  No results found.  Radiography was independently reviewed.      Telemetry: sinus rhythm    VTE Risk Assessment and Plan  Current anticoagulants:    None          GI prophylaxis  None      PHYSICAL EXAM    GEN: Uncomfortable appearing  HENT: NC/AT, no deformity  NECK: supple, no lymphadenopathy  CARD: RRR, no rubs, murmur, gallops  RESP: CTA, no wheeze, rales, crackles  Gi: soft, NTND, +BS  EXT: no edema, pulses present  SKIN: warm, dry, no rash  NEURO: AAOx3, nonfocal        ASSESSMENT & PLAN    Anxiety  Assessment & Plan  Continue lexapro    * Maxillary hypoplasia  Assessment & Plan  S/p Maxillofacial surgery    CXR for airway check in AM  Dexamethasone taper  D5 with 1/2 NS IVF  Dilaudid PRN      Acting as a scribe in the presence of Dr. Rajesh Hickey DO  I have reviewed and agree with the above note.  Rajesh Vasquez MD  [id number]  2/16/2022  8:52 AM

## 2022-02-15 NOTE — ANESTHESIA PROCEDURE NOTES
Airway  Urgency: elective    Start Time: 2/15/2022 7:46 AM  Airway not difficult    General Information and Staff    Patient location during procedure: OR  Anesthesiologist: Isaura Squires MD  Performed: anesthesiologist     Indications and Patient Condition  Indications for airway management: anesthesia  Sedation level: general  Preoxygenated: yes  Patient position: sniffing  Mask difficulty assessment: 2 - vent by mask + OA or adjuvant +/- NMBA (Nasal airway used for dilation)    Final Airway Details  Final airway type: endotracheal airway      Successful airway: SHASHA tube  Cuffed: yes   Successful intubation technique: direct laryngoscopy  Facilitating devices/methods: NPA and anterior pressure/BURP  Endotracheal tube insertion site: right naris  Blade: Tu  Blade size: #4  Cormack-Lehane Classification: grade I - full view of glottis  Placement verified by: chest auscultation and capnometry   Measured from: nares  ETT to nares (cm): 29  Number of attempts at approach: 1  Number of other approaches attempted: 0  Atraumatic airway insertion      Additional Comments  BMV with sequential 30, 32, and 34 Fr nasal airways in R nare to dilate.  R nare selected because nasal airway passed easier through it than L.  7.5 nasal SHASHA tube softened in warm water, then lubricated.  1 mL phenylephrine squirted into R nare and then SHASHA tube inserted with minimal resistance.  DL with MAC 3, grade 1 view with slight cricoid pressure.  Secretions and small amount of blood suctioned.  ETT advanced under direct visualization, went through cords without needing Kay forceps.

## 2022-02-15 NOTE — ANESTHESIOLOGIST PRE-PROCEDURE ATTESTATION
Pre-Procedure Patient Identification:  I am the Primary Anesthesiologist and have identified the patient on 02/15/22 at 7:12 AM.   I have confirmed the procedure(s) will be performed by the following surgeon/proceduralist Aleksandr Tracy DMD.

## 2022-02-15 NOTE — BRIEF OP NOTE
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  Patient underwent maxillary advancement and impaction with mandibular set back.    Estimated Blood Loss: 350 mL    Specimens:                Order Name Source Comment Collection Info Order Time   TYPE AND SCREEN Blood, Venous  Collected By: Roberta Burch RN 2/15/2022  6:43 AM     Are you aware of this patient having previously identified antibodies?   NO          Does this Patient have Sickle Cell Disease/Sickle Cell Anemia?   NO          Release to patient   Immediate        REPEAT ABO/RH (TYPE CHECK) Blood, Venous  Collected By: Elle Jesus RN 2/15/2022  7:05 AM     Release to patient   Immediate              Drains:   [REMOVED] Urethral Catheter Latex 16 Fr (Removed)       Implants:   Implant Name Type Inv. Item Serial No.  Lot No. LRB No. Used Action   Level one CMF plate, L SHP, right    ROM MCMAHAN  Right 2 Implanted   Level one CMF plate, L SHP, Left    ROM MCMAHAN  Left 2 Implanted   SCREW LEVEL ONE CMF 2.0 X 5MM - [id number]  SCREW LEVEL ONE CMF 2.0 X 5MM  ROM MCMAHAN  N/A 15 Implanted   SCREW LEVEL ONE CMF 2.0 X 5MM - [id number]  SCREW LEVEL ONE CMF 2.0 X 5MM  ROM MCMAHAN  Right 1 Wasted   SCREW MAXDRIVE  MINI 2.0MMX13 - [id number]  SCREW MAXDRIVE MINI 2.0MMX13  KLS MARJ  N/A 4 Implanted   SCREW MAXDRIVE MINI 2.0MMX11 - [id number]  SCREW MAXDRIVE MINI 2.0MMX11  KLS MARJ  N/A 1 Implanted   Max drive mini screw 2.0x15    KLS MARJ  N/A 1 Implanted              Complications:  None; patient tolerated the procedure well.           Disposition: PACU - hemodynamically stable. Patient to go to ICU later           Condition: stable    Tyrell Chavez MD [facility]  [phone]

## 2022-02-15 NOTE — PERIOPERATIVE NURSING NOTE
Left message on wife's cell phone at this time with update.  Waiting for ICU nurse to call backKerry.  JUDAH, medicated with 25mcg of fentanyl for pain, smaller dose given.  Patient sleeping at this time, JUDAH. HOB elevated, wire cutters taped on left side head of bed.

## 2022-02-16 ENCOUNTER — APPOINTMENT (INPATIENT)
Dept: RADIOLOGY | Facility: HOSPITAL | Age: 37
End: 2022-02-16
Attending: DENTIST
Payer: COMMERCIAL

## 2022-02-16 ENCOUNTER — APPOINTMENT (INPATIENT)
Dept: RADIOLOGY | Facility: HOSPITAL | Age: 37
End: 2022-02-16
Attending: STUDENT IN AN ORGANIZED HEALTH CARE EDUCATION/TRAINING PROGRAM
Payer: COMMERCIAL

## 2022-02-16 LAB
ALBUMIN SERPL-MCNC: 3.8 G/DL (ref 3.4–5)
ALP SERPL-CCNC: 59 IU/L (ref 35–126)
ALT SERPL-CCNC: 28 IU/L (ref 16–63)
ANION GAP SERPL CALC-SCNC: 9 MEQ/L (ref 3–15)
AST SERPL-CCNC: 27 IU/L (ref 15–41)
BASOPHILS # BLD: 0.02 K/UL (ref 0.01–0.1)
BASOPHILS NFR BLD: 0.1 %
BILIRUB SERPL-MCNC: 0.6 MG/DL (ref 0.3–1.2)
BUN SERPL-MCNC: 12 MG/DL (ref 8–20)
CALCIUM SERPL-MCNC: 8.6 MG/DL (ref 8.9–10.3)
CHLORIDE SERPL-SCNC: 104 MEQ/L (ref 98–109)
CO2 SERPL-SCNC: 24 MEQ/L (ref 22–32)
CREAT SERPL-MCNC: 0.7 MG/DL (ref 0.8–1.3)
DIFFERENTIAL METHOD BLD: ABNORMAL
EOSINOPHIL # BLD: 0 K/UL (ref 0.04–0.54)
EOSINOPHIL NFR BLD: 0 %
ERYTHROCYTE [DISTWIDTH] IN BLOOD BY AUTOMATED COUNT: 11.9 % (ref 11.6–14.4)
GFR SERPL CREATININE-BSD FRML MDRD: >60 ML/MIN/1.73M*2
GLUCOSE SERPL-MCNC: 145 MG/DL (ref 70–99)
HCT VFR BLDCO AUTO: 36.8 % (ref 40.1–51)
HGB BLD-MCNC: 12.2 G/DL (ref 13.7–17.5)
IMM GRANULOCYTES # BLD AUTO: 0.11 K/UL (ref 0–0.08)
IMM GRANULOCYTES NFR BLD AUTO: 0.6 %
LYMPHOCYTES # BLD: 0.7 K/UL (ref 1.2–3.5)
LYMPHOCYTES NFR BLD: 3.5 %
MAGNESIUM SERPL-MCNC: 2 MG/DL (ref 1.8–2.5)
MCH RBC QN AUTO: 29.4 PG (ref 28–33.2)
MCHC RBC AUTO-ENTMCNC: 33.2 G/DL (ref 32.2–36.5)
MCV RBC AUTO: 88.7 FL (ref 83–98)
MONOCYTES # BLD: 1.16 K/UL (ref 0.3–1)
MONOCYTES NFR BLD: 5.9 %
NEUTROPHILS # BLD: 17.78 K/UL (ref 1.7–7)
NEUTS SEG NFR BLD: 89.9 %
NRBC BLD-RTO: 0 %
PDW BLD AUTO: 9.7 FL (ref 9.4–12.4)
PLATELET # BLD AUTO: 241 K/UL (ref 150–350)
POTASSIUM SERPL-SCNC: 4.2 MEQ/L (ref 3.6–5.1)
PROT SERPL-MCNC: 7 G/DL (ref 6–8.2)
RBC # BLD AUTO: 4.15 M/UL (ref 4.5–5.8)
SODIUM SERPL-SCNC: 137 MEQ/L (ref 136–144)
WBC # BLD AUTO: 19.77 K/UL (ref 3.8–10.5)

## 2022-02-16 PROCEDURE — 25800000 HC PHARMACY IV SOLUTIONS: Performed by: STUDENT IN AN ORGANIZED HEALTH CARE EDUCATION/TRAINING PROGRAM

## 2022-02-16 PROCEDURE — 80053 COMPREHEN METABOLIC PANEL: CPT | Performed by: DENTIST

## 2022-02-16 PROCEDURE — 36415 COLL VENOUS BLD VENIPUNCTURE: CPT | Performed by: DENTIST

## 2022-02-16 PROCEDURE — 83735 ASSAY OF MAGNESIUM: CPT | Performed by: STUDENT IN AN ORGANIZED HEALTH CARE EDUCATION/TRAINING PROGRAM

## 2022-02-16 PROCEDURE — 20000000 HC ROOM AND CARE ICU

## 2022-02-16 PROCEDURE — 94640 AIRWAY INHALATION TREATMENT: CPT

## 2022-02-16 PROCEDURE — 63700000 HC SELF-ADMINISTRABLE DRUG: Performed by: DENTIST

## 2022-02-16 PROCEDURE — 25800000 HC PHARMACY IV SOLUTIONS: Performed by: DENTIST

## 2022-02-16 PROCEDURE — 71045 X-RAY EXAM CHEST 1 VIEW: CPT

## 2022-02-16 PROCEDURE — 85025 COMPLETE CBC W/AUTO DIFF WBC: CPT | Performed by: DENTIST

## 2022-02-16 PROCEDURE — 70150 X-RAY EXAM OF FACIAL BONES: CPT

## 2022-02-16 PROCEDURE — 63600000 HC DRUGS/DETAIL CODE: Performed by: STUDENT IN AN ORGANIZED HEALTH CARE EDUCATION/TRAINING PROGRAM

## 2022-02-16 RX ORDER — DEXTROSE, SODIUM CHLORIDE, SODIUM LACTATE, POTASSIUM CHLORIDE, AND CALCIUM CHLORIDE 5; .6; .31; .03; .02 G/100ML; G/100ML; G/100ML; G/100ML; G/100ML
INJECTION, SOLUTION INTRAVENOUS CONTINUOUS
Status: DISCONTINUED | OUTPATIENT
Start: 2022-02-16 | End: 2022-02-18

## 2022-02-16 RX ORDER — CHLORHEXIDINE GLUCONATE ORAL RINSE 1.2 MG/ML
15 SOLUTION DENTAL 2 TIMES DAILY
Status: DISCONTINUED | OUTPATIENT
Start: 2022-02-16 | End: 2022-02-18

## 2022-02-16 RX ORDER — ACETAMINOPHEN 650 MG/20.3ML
650 LIQUID ORAL EVERY 4 HOURS PRN
Status: DISCONTINUED | OUTPATIENT
Start: 2022-02-16 | End: 2022-02-17

## 2022-02-16 RX ADMIN — SODIUM CHLORIDE: 9 INJECTION, SOLUTION INTRAVENOUS at 09:01

## 2022-02-16 RX ADMIN — CHLORHEXIDINE GLUCONATE 0.12% ORAL RINSE 15 ML: 1.2 LIQUID ORAL at 08:07

## 2022-02-16 RX ADMIN — AMPICILLIN SODIUM AND SULBACTAM SODIUM 3 G: 2; 1 INJECTION, POWDER, FOR SOLUTION INTRAMUSCULAR; INTRAVENOUS at 13:21

## 2022-02-16 RX ADMIN — SODIUM CHLORIDE 500 ML: 9 INJECTION, SOLUTION INTRAVENOUS at 13:40

## 2022-02-16 RX ADMIN — CHLORHEXIDINE GLUCONATE 0.12% ORAL RINSE 15 ML: 1.2 LIQUID ORAL at 20:27

## 2022-02-16 RX ADMIN — SODIUM CHLORIDE 500 ML: 9 INJECTION, SOLUTION INTRAVENOUS at 14:48

## 2022-02-16 RX ADMIN — SODIUM CHLORIDE: 9 INJECTION, SOLUTION INTRAVENOUS at 09:57

## 2022-02-16 RX ADMIN — DEXAMETHASONE SODIUM PHOSPHATE 6 MG: 4 INJECTION, SOLUTION INTRA-ARTICULAR; INTRALESIONAL; INTRAMUSCULAR; INTRAVENOUS; SOFT TISSUE at 08:04

## 2022-02-16 RX ADMIN — DEXAMETHASONE SODIUM PHOSPHATE 6 MG: 4 INJECTION, SOLUTION INTRA-ARTICULAR; INTRALESIONAL; INTRAMUSCULAR; INTRAVENOUS; SOFT TISSUE at 13:17

## 2022-02-16 RX ADMIN — DOCUSATE SODIUM AND SENNOSIDES 1 TABLET: 50; 8.6 TABLET ORAL at 20:51

## 2022-02-16 RX ADMIN — AMPICILLIN SODIUM AND SULBACTAM SODIUM 3 G: 2; 1 INJECTION, POWDER, FOR SOLUTION INTRAMUSCULAR; INTRAVENOUS at 20:08

## 2022-02-16 RX ADMIN — SODIUM CHLORIDE, SODIUM LACTATE, POTASSIUM CHLORIDE, CALCIUM CHLORIDE AND DEXTROSE MONOHYDRATE: 5; 600; 310; 30; 20 INJECTION, SOLUTION INTRAVENOUS at 10:37

## 2022-02-16 RX ADMIN — SODIUM CHLORIDE 500 ML: 9 INJECTION, SOLUTION INTRAVENOUS at 17:50

## 2022-02-16 RX ADMIN — SODIUM CHLORIDE: 9 INJECTION, SOLUTION INTRAVENOUS at 10:38

## 2022-02-16 RX ADMIN — DEXAMETHASONE SODIUM PHOSPHATE 6 MG: 4 INJECTION, SOLUTION INTRA-ARTICULAR; INTRALESIONAL; INTRAMUSCULAR; INTRAVENOUS; SOFT TISSUE at 20:51

## 2022-02-16 RX ADMIN — AMPICILLIN SODIUM AND SULBACTAM SODIUM 3 G: 2; 1 INJECTION, POWDER, FOR SOLUTION INTRAMUSCULAR; INTRAVENOUS at 08:03

## 2022-02-16 RX ADMIN — IBUPROFEN 600 MG: 100 SUSPENSION ORAL at 13:47

## 2022-02-16 RX ADMIN — DEXTROSE AND SODIUM CHLORIDE 100 ML/HR: 5; 450 INJECTION, SOLUTION INTRAVENOUS at 04:22

## 2022-02-16 RX ADMIN — AMPICILLIN SODIUM AND SULBACTAM SODIUM 3 G: 2; 1 INJECTION, POWDER, FOR SOLUTION INTRAMUSCULAR; INTRAVENOUS at 02:16

## 2022-02-16 RX ADMIN — HYDROMORPHONE HYDROCHLORIDE 0.4 MG: 1 INJECTION, SOLUTION INTRAMUSCULAR; INTRAVENOUS; SUBCUTANEOUS at 03:26

## 2022-02-16 RX ADMIN — DEXAMETHASONE SODIUM PHOSPHATE 8 MG: 4 INJECTION, SOLUTION INTRA-ARTICULAR; INTRALESIONAL; INTRAMUSCULAR; INTRAVENOUS; SOFT TISSUE at 02:17

## 2022-02-16 RX ADMIN — SODIUM CHLORIDE 500 ML/HR: 9 INJECTION, SOLUTION INTRAVENOUS at 04:23

## 2022-02-16 NOTE — PLAN OF CARE
Problem: Adult Inpatient Plan of Care  Goal: Readiness for Transition of Care  Outcome: Progressing  Intervention: Mutually Develop Transition Plan  Flowsheets (Taken 2/16/2022 9725)  Anticipated Discharge Disposition: home with assistance  Equipment Needed After Discharge: none  Assistive Device/Animal Currently Used at Home: none  Transportation Concerns: car, none  Readmission Within the Last 30 Days: no previous admission in last 30 days  Patient/Family Anticipated Services at Transition: none  Patient/Family Anticipates Transition to: home with family  Transportation Anticipated: family or friend will provide  Concerns to be Addressed: no discharge needs identified   Spoke with Pt and wife at beside. Pt admitted s/p [place] Procedure. Wife confirmed address, pharmacy, and PCP. Pt lives in 2 story house with 2 steps to enter, CT on 1st, 2nd full bath, Pt is independent with ADL's , uses no assistive equipment or medical services in home. Denies SNF, Acute rehab or home health. Wife has list of instructions for supplemental feeding while on liquid diet.    Discharge Plan     Home with family

## 2022-02-16 NOTE — PROGRESS NOTES
Critical Care Progress Note    Hospital Day 2  Vent Day N/A    INTERVAL SUMMARY    No overnight events. Patient to be transferred to [facility].   Patient reports pain is mild on examination. Patient noted to have wheezing on examination, most prominent with tracheal auscultation, possibly 2/2 from aspiration from surgical site. Sitting patient upright and having him perform swallowing maneuver reduced wheezing and leaving an inspiratory stridor. Contacting Dr. Tracy affirmed it is OK to suction patient, but suction would have to be worked around surgical wiring.    Due to concern of increased shortness of breath, patient continuing to be monitored in ICU.    OBJECTIVE   VITAL SIGNS  Temp:  [36.4 °C (97.5 °F)-38 °C (100.4 °F)] 36.9 °C (98.4 °F)  Heart Rate:  [68-98] 81  Resp:  [9-22] 14  BP: (117-131)/(58-74) 126/73  FiO2 (%) (Set):  [40 %-100 %] 40 %  FiO2 (%) (Set):  [40 %-100 %] 40 %    Intake/Output Summary (Last 24 hours) at 2/16/2022 1125  Last data filed at 2/16/2022 1100  Gross per 24 hour   Intake 6983.33 ml   Output 5050 ml   Net 1933.33 ml       O2 Requirement:  Oxygen Therapy: Supplemental oxygen  O2 Delivery Method: Face tent (humidified)  FiO2 (%) (Set): 40 %  O2 Flow Rate (L/min): 15 L/min       MEDICATIONS  ampicillin-sulbactam, 3 g, q6h INT  chlorhexidine, 15 mL, BID  dexamethasone, 6 mg, q6h INT   Followed by  [START ON 2/17/2022] dexamethasone, 4 mg, q6h INT   Followed by  [START ON 2/17/2022] dexamethasone, 2 mg, q6h INT  sennosides-docusate sodium, 1 tablet, BID           LAB RESULTS  Recent Results (from the past 24 hour(s))   CBC and differential    Collection Time: 02/16/22  6:04 AM   Result Value Ref Range    WBC 19.77 (H) 3.80 - 10.50 K/uL    RBC 4.15 (L) 4.50 - 5.80 M/uL    Hemoglobin 12.2 (L) 13.7 - 17.5 g/dL    Hematocrit 36.8 (L) 40.1 - 51.0 %    MCV 88.7 83.0 - 98.0 fL    MCH 29.4 28.0 - 33.2 pg    MCHC 33.2 32.2 - 36.5 g/dL    RDW 11.9 11.6 - 14.4 %    Platelets 241 150 - 350 K/uL     MPV 9.7 9.4 - 12.4 fL    Differential Type Auto     nRBC 0.0 <=0.0 %    Immature Granulocytes 0.6 %    Neutrophils 89.9 %    Lymphocytes 3.5 %    Monocytes 5.9 %    Eosinophils 0.0 %    Basophils 0.1 %    Immature Granulocytes, Absolute 0.11 (H) 0.00 - 0.08 K/uL    Neutrophils, Absolute 17.78 (H) 1.70 - 7.00 K/uL    Lymphocytes, Absolute 0.70 (L) 1.20 - 3.50 K/uL    Monocytes, Absolute 1.16 (H) 0.30 - 1.00 K/uL    Eosinophils, Absolute 0.00 (L) 0.04 - 0.54 K/uL    Basophils, Absolute 0.02 0.01 - 0.10 K/uL   Comprehensive metabolic panel    Collection Time: 02/16/22  6:04 AM   Result Value Ref Range    Sodium 137 136 - 144 mEQ/L    Potassium 4.2 3.6 - 5.1 mEQ/L    Chloride 104 98 - 109 mEQ/L    CO2 24 22 - 32 mEQ/L    BUN 12 8 - 20 mg/dL    Creatinine 0.7 (L) 0.8 - 1.3 mg/dL    Glucose 145 (H) 70 - 99 mg/dL    Calcium 8.6 (L) 8.9 - 10.3 mg/dL    AST (SGOT) 27 15 - 41 IU/L    ALT (SGPT) 28 16 - 63 IU/L    Alkaline Phosphatase 59 35 - 126 IU/L    Total Protein 7.0 6.0 - 8.2 g/dL    Albumin 3.8 3.4 - 5.0 g/dL    Bilirubin, Total 0.6 0.3 - 1.2 mg/dL    eGFR >60.0 >=60.0 mL/min/1.73m*2    Anion Gap 9 3 - 15 mEQ/L   Magnesium    Collection Time: 02/16/22  6:04 AM   Result Value Ref Range    Magnesium 2.0 1.8 - 2.5 mg/dL     Laboratory results were independently reviewed    ABG          CULTURES  Microbiology Results       Procedure Component Value Units Date/Time    COVID-19 PCR PAT/Pre-procedural [[id number]]  (Normal) Collected: 02/10/22 1032    Specimen: Nasopharyngeal Swab from Nasopharynx Updated: 02/10/22 5090    Narrative:      The following orders were created for panel order COVID-19 PCR PAT/Pre-procedural.  Procedure                               Abnormality         Status                     ---------                               -----------         ------                     SARS-CoV-2 (COVID-19), PCR[[id number]]   Normal              Final result                 Please view results for these tests on the  individual orders.    SARS-CoV-2 (COVID-19), PCR [[id number]]  (Normal) Collected: 02/10/22 1032    Specimen: Nasopharyngeal Swab from Nasopharynx Updated: 02/10/22 1755     SARS-CoV-2 (COVID-19) Negative            Laboratory results were independently reviewed    RADIOLOGY  No results found.  Radiography was independently reviewed.      Telemetry: sinus rhythm    VTE Risk Assessment and Plan  Current anticoagulants:    None          GI prophylaxis  None      PHYSICAL EXAM    GEN: s/p Maxillofacial surgery  HENT: No active discharge/bleeding seen from mouth  NECK: supple, no lymphadenopathy  CARD: RRR, no rubs, murmur, gallops  RESP: wheezing initially, then inspiratory stridor s/p readjustment  Gi: soft, NTND, +BS  EXT: no edema, pulses present  SKIN: warm, dry, no rash        ASSESSMENT & PLAN    Anxiety  Assessment & Plan  Continue lexapro    * Maxillary hypoplasia  Assessment & Plan  S/p Maxillofacial surgery    Attempt OOB/ambulation  Facial series today  Nutrition c/s  Dexamethasone taper  Dilaudid PRN  Monitor respiratory status  Suction PRN        Acting as a scribe in the presence of Dr. Rajesh Hickey DO  I have reviewed and agree with the above note.  Rajesh Vasquez MD  [id number]. D/w pt and surgeon.  2/17/2022  8:15 AM

## 2022-02-16 NOTE — PLAN OF CARE
Problem: Adult Inpatient Plan of Care  Goal: Plan of Care Review  Flowsheets (Taken 2/16/2022 1224)  Progress: no change  Plan of Care Reviewed With: patient  Outcome Summary: Pt taking small amount of liquids for breakfast. Pt sleeping most of day so far. Pt receptive to trying oral nutrition supplements.  Goal: consume % energy and protein needs via straw  Recommendations/Interventions:  Provided pt printed information on wired jaw diet.  Will send 2 Boost Breeze tid while on clear liquid diet.  Will change Boost breeze to Boost plus once diet advances to full liquids/liquified diet.  Monitor po intake and labs.

## 2022-02-16 NOTE — NURSING NOTE
"Dr Hickey notified-Patient writes on paper \"breathing is difficult. Breathing is tight\". SpO2 is 99% on 40% humidified face tent. Lungs sound clear but dimidshed to auscultation. Stridor noted over tracheal area.   "

## 2022-02-16 NOTE — PATIENT CARE CONFERENCE
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 IV decadron for ins wheezing IVFs  SX PRN    Participants:  advanced practice provider,pharmacy,,physical therapy,dietitian/nutrition services,physician,nursing,respiratory therapy,occupational therapy

## 2022-02-16 NOTE — PROGRESS NOTES
Oral & Maxillofacial Surgery Daily Progress Note    Subjective   Pt comfortable s/p Maxillary and mandibular osteotomies, with expected pain but well controlled.      Objective     Mild facial edema, pt comfortable in NAD, IMF intact and occlusion stable, Maxilla Pink and well perfused. Splint intact. Decreased sensation bilat V3 as expected. Excellent facial esthetics.    Vital signs in last 24 hours:  Temp:  [36.4 °C (97.5 °F)-38 °C (100.4 °F)] 38 °C (100.4 °F)  Heart Rate:  [68-98] 68  Resp:  [12-22] 14  BP: (117-131)/(58-74) 117/60  FiO2 (%) (Set):  [40 %-100 %] 40 %      Intake/Output Summary (Last 24 hours) at 2/16/2022 0710  Last data filed at 2/16/2022 0600  Gross per 24 hour   Intake 5208.33 ml   Output 2850 ml   Net 2358.33 ml     Intake/Output this shift:  No intake/output data recorded.    Physical Exam    General appearance: alert and no distress  Head: normocephalic, without obvious abnormality, IMF intact, maxilla pink  Eyes: EOMI, Vision intact  Ears: No D/C or exudate  Nose: Nares normal. Septum midline. Mucosa normal. No drainage or sinus tenderness.  Throat: lips, mucosa, and tongue normal; teeth and gums normal  Neck: no adenopathy, no JVD and supple, symmetrical, trachea midline  Back: negative  Lungs: clear to auscultation bilaterally  Heart: regular rate and rhythm, S1, S2 normal, no murmur, click, rub or gallop  Abdomen: soft, non-tender; bowel sounds normal; no masses, no organomegaly  Extremities: extremities normal, warm and well-perfused; no cyanosis, clubbing, or edema and Homans sign is negative, no sign of DVT  Pulses: 2+ and symmetric  Skin: Skin color, texture, turgor normal. No rashes or lesions  Lymph nodes: No abnormality  Neurologic: Non focal    VTE Assessment: I have reassessed and the patient's VTE risk and treatment plan is appropriate.    Labs  Results from last 7 days   Lab Units 02/16/22  0604   WBC K/uL 19.77*   HEMOGLOBIN g/dL 12.2*   HEMATOCRIT % 36.8*   PLATELETS K/uL  241     Results from last 7 days   Lab Units 02/16/22  0604   SODIUM mEQ/L 137   POTASSIUM mEQ/L 4.2   CHLORIDE mEQ/L 104   CO2 mEQ/L 24   BUN mg/dL 12   CREATININE mg/dL 0.7*   GLUCOSE mg/dL 145*   CALCIUM mg/dL 8.6*         Imaging  I have reviewed the Imaging from the last 24 hrs.      Assessment/Plan       Doing well, X-wei to med-surg   -OOB and encourage ambulation  - facial series and panorex today  - nutrition consult  - Clean nares Q Shift and PRN with cotton swabs and Hydrogen peroxide.    Aleksandr Tracy, DMD   [phone]

## 2022-02-17 PROBLEM — M26.02 MAXILLARY HYPOPLASIA: Status: RESOLVED | Noted: 2022-02-15 | Resolved: 2022-02-17

## 2022-02-17 PROCEDURE — 63600000 HC DRUGS/DETAIL CODE: Performed by: DENTIST

## 2022-02-17 PROCEDURE — 63700000 HC SELF-ADMINISTRABLE DRUG: Performed by: DENTIST

## 2022-02-17 PROCEDURE — 25800000 HC PHARMACY IV SOLUTIONS: Performed by: STUDENT IN AN ORGANIZED HEALTH CARE EDUCATION/TRAINING PROGRAM

## 2022-02-17 PROCEDURE — 12000000 HC ROOM AND CARE MED/SURG

## 2022-02-17 PROCEDURE — 63600000 HC DRUGS/DETAIL CODE: Performed by: PHYSICIAN ASSISTANT

## 2022-02-17 PROCEDURE — 63700000 HC SELF-ADMINISTRABLE DRUG: Performed by: PHYSICIAN ASSISTANT

## 2022-02-17 PROCEDURE — 63600000 HC DRUGS/DETAIL CODE: Performed by: STUDENT IN AN ORGANIZED HEALTH CARE EDUCATION/TRAINING PROGRAM

## 2022-02-17 PROCEDURE — 94640 AIRWAY INHALATION TREATMENT: CPT

## 2022-02-17 RX ORDER — ONDANSETRON HYDROCHLORIDE 2 MG/ML
4 INJECTION, SOLUTION INTRAVENOUS EVERY 6 HOURS PRN
Status: DISCONTINUED | OUTPATIENT
Start: 2022-02-17 | End: 2022-02-18

## 2022-02-17 RX ORDER — ACETAMINOPHEN AND CODEINE PHOSPHATE 120; 12 MG/5ML; MG/5ML
12.5 SOLUTION ORAL EVERY 4 HOURS PRN
Status: DISCONTINUED | OUTPATIENT
Start: 2022-02-17 | End: 2022-02-18

## 2022-02-17 RX ORDER — CHLORHEXIDINE GLUCONATE ORAL RINSE 1.2 MG/ML
15 SOLUTION DENTAL 2 TIMES DAILY
Qty: 420 ML | Refills: 0 | Status: SHIPPED
Start: 2022-02-17 | End: 2022-03-03

## 2022-02-17 RX ORDER — ESCITALOPRAM OXALATE 10 MG/1
10 TABLET ORAL DAILY
Status: DISCONTINUED | OUTPATIENT
Start: 2022-02-17 | End: 2022-02-18

## 2022-02-17 RX ORDER — TRIPROLIDINE/PSEUDOEPHEDRINE 2.5MG-60MG
600 TABLET ORAL EVERY 6 HOURS PRN
Qty: 500 ML | Refills: 1 | Status: SHIPPED
Start: 2022-02-17 | End: 2022-03-19

## 2022-02-17 RX ORDER — AMOXICILLIN 400 MG/5ML
800 POWDER, FOR SUSPENSION ORAL 2 TIMES DAILY
Qty: 140 ML | Refills: 0 | Status: SHIPPED
Start: 2022-02-17 | End: 2022-02-24

## 2022-02-17 RX ORDER — ACETAMINOPHEN AND CODEINE PHOSPHATE 120; 12 MG/5ML; MG/5ML
15 SOLUTION ORAL EVERY 4 HOURS PRN
Qty: 120 ML | Refills: 1 | Status: SHIPPED | OUTPATIENT
Start: 2022-02-17 | End: 2022-02-22

## 2022-02-17 RX ORDER — ONDANSETRON HYDROCHLORIDE 4 MG/5ML
4 SOLUTION ORAL 2 TIMES DAILY PRN
Qty: 50 ML | Refills: 1 | Status: SHIPPED
Start: 2022-02-17

## 2022-02-17 RX ADMIN — ONDANSETRON 4 MG: 2 INJECTION INTRAMUSCULAR; INTRAVENOUS at 15:50

## 2022-02-17 RX ADMIN — SALINE NASAL SPRAY 2 SPRAY: 1.5 SOLUTION NASAL at 19:18

## 2022-02-17 RX ADMIN — DOCUSATE SODIUM AND SENNOSIDES 1 TABLET: 50; 8.6 TABLET ORAL at 08:13

## 2022-02-17 RX ADMIN — CHLORHEXIDINE GLUCONATE 0.12% ORAL RINSE 15 ML: 1.2 LIQUID ORAL at 08:13

## 2022-02-17 RX ADMIN — DEXAMETHASONE SODIUM PHOSPHATE 2 MG: 4 INJECTION, SOLUTION INTRA-ARTICULAR; INTRALESIONAL; INTRAMUSCULAR; INTRAVENOUS; SOFT TISSUE at 14:06

## 2022-02-17 RX ADMIN — HYDROMORPHONE HYDROCHLORIDE 0.4 MG: 1 INJECTION, SOLUTION INTRAMUSCULAR; INTRAVENOUS; SUBCUTANEOUS at 08:13

## 2022-02-17 RX ADMIN — DEXAMETHASONE SODIUM PHOSPHATE 4 MG: 4 INJECTION, SOLUTION INTRA-ARTICULAR; INTRALESIONAL; INTRAMUSCULAR; INTRAVENOUS; SOFT TISSUE at 01:22

## 2022-02-17 RX ADMIN — ESCITALOPRAM OXALATE 10 MG: 10 TABLET ORAL at 14:05

## 2022-02-17 RX ADMIN — DOCUSATE SODIUM AND SENNOSIDES 1 TABLET: 50; 8.6 TABLET ORAL at 20:04

## 2022-02-17 RX ADMIN — IBUPROFEN 600 MG: 100 SUSPENSION ORAL at 12:41

## 2022-02-17 RX ADMIN — DEXAMETHASONE SODIUM PHOSPHATE 4 MG: 4 INJECTION, SOLUTION INTRA-ARTICULAR; INTRALESIONAL; INTRAMUSCULAR; INTRAVENOUS; SOFT TISSUE at 08:13

## 2022-02-17 RX ADMIN — CHLORHEXIDINE GLUCONATE 0.12% ORAL RINSE 15 ML: 1.2 LIQUID ORAL at 20:04

## 2022-02-17 RX ADMIN — SODIUM CHLORIDE, SODIUM LACTATE, POTASSIUM CHLORIDE, CALCIUM CHLORIDE AND DEXTROSE MONOHYDRATE: 5; 600; 310; 30; 20 INJECTION, SOLUTION INTRAVENOUS at 08:01

## 2022-02-17 RX ADMIN — SODIUM CHLORIDE, SODIUM LACTATE, POTASSIUM CHLORIDE, CALCIUM CHLORIDE AND DEXTROSE MONOHYDRATE: 5; 600; 310; 30; 20 INJECTION, SOLUTION INTRAVENOUS at 12:45

## 2022-02-17 RX ADMIN — AMPICILLIN SODIUM AND SULBACTAM SODIUM 3 G: 2; 1 INJECTION, POWDER, FOR SOLUTION INTRAMUSCULAR; INTRAVENOUS at 08:13

## 2022-02-17 RX ADMIN — AMPICILLIN SODIUM AND SULBACTAM SODIUM 3 G: 2; 1 INJECTION, POWDER, FOR SOLUTION INTRAMUSCULAR; INTRAVENOUS at 02:00

## 2022-02-17 RX ADMIN — AMPICILLIN SODIUM AND SULBACTAM SODIUM 3 G: 2; 1 INJECTION, POWDER, FOR SOLUTION INTRAMUSCULAR; INTRAVENOUS at 20:04

## 2022-02-17 RX ADMIN — HYDROMORPHONE HYDROCHLORIDE 0.4 MG: 1 INJECTION, SOLUTION INTRAMUSCULAR; INTRAVENOUS; SUBCUTANEOUS at 15:51

## 2022-02-17 RX ADMIN — AMPICILLIN SODIUM AND SULBACTAM SODIUM 3 G: 2; 1 INJECTION, POWDER, FOR SOLUTION INTRAMUSCULAR; INTRAVENOUS at 14:06

## 2022-02-17 NOTE — PROGRESS NOTES
Oral & Maxillofacial Surgery Daily Progress Note    Subjective   No complaints overnight.  Pain appears well controlled.        Objective     Mild facial edema, pt comfortable in NAD, IMF intact and occlusion stable, Maxilla Pink and well perfused. Splint intact. Decreased sensation bilat V3 as expected. Excellent facial esthetics.    Vital signs in last 24 hours:  Temp:  [36.7 °C (98 °F)-37.1 °C (98.7 °F)] 36.7 °C (98 °F)  Heart Rate:  [] 95  Resp:  [7-26] 14  BP: (104-160)/(61-78) 140/77  FiO2 (%) (Set):  [40 %] 40 %      Intake/Output Summary (Last 24 hours) at 2/17/2022 0800  Last data filed at 2/17/2022 0700  Gross per 24 hour   Intake 6795 ml   Output 5900 ml   Net 895 ml     Intake/Output this shift:  I/O this shift:  In: 100 [I.V.:100]  Out: -     Physical Exam    General appearance: alert and no distress  Head: normocephalic, without obvious abnormality, IMF intact, maxilla pink  Eyes: EOMI, Vision intact  Ears: No D/C or exudate  Nose: Nares normal. Septum midline. Mucosa normal. No drainage or sinus tenderness.  Throat: lips, mucosa, and tongue normal; teeth and gums normal  Neck: no adenopathy, no JVD and supple, symmetrical, trachea midline  Back: negative  Lungs: clear to auscultation bilaterally  Heart: regular rate and rhythm, S1, S2 normal, no murmur, click, rub or gallop  Abdomen: soft, non-tender; bowel sounds normal; no masses, no organomegaly  Extremities: extremities normal, warm and well-perfused; no cyanosis, clubbing, or edema and Homans sign is negative, no sign of DVT  Pulses: 2+ and symmetric  Skin: Skin color, texture, turgor normal. No rashes or lesions  Lymph nodes: No abnormality  Neurologic: Non focal    VTE Assessment: I have reassessed and the patient's VTE risk and treatment plan is appropriate.    Labs  Results from last 7 days   Lab Units 02/16/22  0604   WBC K/uL 19.77*   HEMOGLOBIN g/dL 12.2*   HEMATOCRIT % 36.8*   PLATELETS K/uL 241     Results from last 7 days   Lab  Units 02/16/22  0604   SODIUM mEQ/L 137   POTASSIUM mEQ/L 4.2   CHLORIDE mEQ/L 104   CO2 mEQ/L 24   BUN mg/dL 12   CREATININE mg/dL 0.7*   GLUCOSE mg/dL 145*   CALCIUM mg/dL 8.6*         Imaging  Post op radiographs reveal stable hardware, condyles in fossae.      Assessment/Plan   37 yo Male POD#2 s/p LeFort1/BSSO.  Doing well. overall  - Okay from OMFS standpoint for transfer to floor  - OOB and encourage ambulation  - Pain contol  - Liquid diet  - Appreciate nutrition recommendations  - Nasal saline spray prn for congestion    Tyrell Chavez MD   [phone]   none

## 2022-02-17 NOTE — H&P
History & Physical  Pulmonary & Critical Care    Chief Complaint  S/p maxillofacial surgery    History of Present Illness  Mode Hannon is a 36 y.o. male patient presenting to ICU after following procedure for dentofacial deformity/malocclusion, Maxillary hyperplasia, Mandibular hypoplasia:  Lefort I midface osteotomy with advancement and impaction, Nasal Septoplasty, Bilateral Inferior Turbinectomy, Bilateral Sagital Split Mandibular Osteotomy    Procedure uncomplicated. Patient admitted chiefly for airway monitoring.      On examination, patient responds by nodding.     Per nursing report, patient can undergo vasovagal syncope 2/2 anxiety.     Past Medical History:   Diagnosis Date    Anxiety     COVID-19 vaccine series completed     and booster    History of palpitations     and tachycardia in 2016 - states cardiac workup negative    Seasonal allergies     Vasovagal syncope     related to dehydraytion       Past Surgical History:   Procedure Laterality Date    WISDOM TOOTH EXTRACTION         Social History     Socioeconomic History    Marital status:      Spouse name: None    Number of children: None    Years of education: None    Highest education level: None   Occupational History    None   Tobacco Use    Smoking status: Never Smoker    Smokeless tobacco: Never Used   Substance and Sexual Activity    Alcohol use: None     Comment: 8-10 /week    Drug use: Never    Sexual activity: Defer   Other Topics Concern    None   Social History Narrative    None     Social Determinants of Health     Financial Resource Strain: Not on file   Food Insecurity: Not on file   Transportation Needs: Not on file   Physical Activity: Not on file   Stress: Not on file   Social Connections: Not on file   Intimate Partner Violence: Not on file   Housing Stability: Not on file       History reviewed. No pertinent family history.    Review of Systems  All review of systems negative except for as noted  above.    Medications    Current Facility-Administered Medications:     acetaminophen (TYLENOL) 650 mg/20.3 mL solution 650 mg, 650 mg, oral, q4h PRN, Ehsan Caballero PA C    ampicillin-sulbactam (UNASYN) IVPB 3 g/100 mL NSS, 3 g, intravenous, q6h INT, Moy Hickey DO, Last Rate: 200 mL/hr at 02/16/22 2008, 3 g at 02/16/22 2008    chlorhexidine (PERIDEX) 0.12 % mouthwash 15 mL, 15 mL, Swish & Spit, BID, Aleksandr Tracy, DMD, 15 mL at 02/16/22 0807    [COMPLETED] dexAMETHasone (DECADRON) injection 8 mg, 8 mg, intravenous, q6h INT, 8 mg at 02/16/22 0217 **FOLLOWED BY** dexAMETHasone (DECADRON) injection 6 mg, 6 mg, intravenous, q6h INT, 6 mg at 02/16/22 1317 **FOLLOWED BY** [START ON 2/17/2022] dexAMETHasone (DECADRON) injection 4 mg, 4 mg, intravenous, q6h INT **FOLLOWED BY** [START ON 2/17/2022] dexAMETHasone (DECADRON) injection 2 mg, 2 mg, intravenous, q6h INT, Moy Hickey DO    glucose chewable tablet 16-32 g of dextrose, 16-32 g of dextrose, oral, PRN **OR** dextrose 40 % oral gel 15-30 g of dextrose, 15-30 g of dextrose, oral, PRN **OR** glucagon (GLUCAGEN) injection 1 mg, 1 mg, intramuscular, PRN **OR** dextrose in water injection 12.5 g, 25 mL, intravenous, PRN, Tyrell Chavez MD    dextrose 5 % and lactated Ringer's infusion, , intravenous, Continuous, Moy Hickey DO, Last Rate: 100 mL/hr at 02/16/22 1900, Rate Verify at 02/16/22 1900    HYDROmorphone (DILAUDID) injection 0.4 mg, 0.4 mg, intravenous, q3h PRN, Moy Hickey DO, 0.4 mg at 02/16/22 0326    ibuprofen (MOTRIN) 100 mg/5 mL suspension 600 mg, 600 mg, oral, q6h PRN, Tyrell Chavez MD, 600 mg at 02/16/22 1347    sennosides-docusate sodium (SENOKOT-S) 8.6-50 mg per tablet 1 tablet, 1 tablet, oral, BID, Tyrell Chavez MD    sodium chloride 0.9 % infusion, , intravenous, Continuous, Tyrell Chavez MD, Last Rate: 500 mL/hr at 02/16/22 1900, Rate Verify at 02/16/22 1900  Outpatient medication list was  also reviewed.    Vital Signs  Temp:  [36.9 °C (98.4 °F)-38 °C (100.4 °F)] 37.1 °C (98.7 °F)  Heart Rate:  [] 94  Resp:  [7-24] 9  BP: (117-160)/(60-77) 148/69  FiO2 (%) (Set):  [40 %] 40 %  FiO2 (%) (Set):  [40 %] 40 %    Intake/Output Summary (Last 24 hours) at 2/16/2022 2024  Last data filed at 2/16/2022 1900  Gross per 24 hour   Intake 7578.33 ml   Output 6300 ml   Net 1278.33 ml       Telemetry  sinus rhythm     Laboratory  Recent Results (from the past 48 hour(s))   Type and Screen [facility]    Collection Time: 02/15/22  6:44 AM   Result Value Ref Range    Specimen Expiration 02/18/2022     Antibody Screen Negative     ABO A     Rh Factor Positive     History Check No type on file    Repeat ABO/Rh (Type Check)    Collection Time: 02/15/22  7:10 AM   Result Value Ref Range    ABO A     Rh Factor Positive     History Check Previous type on file    CBC and differential    Collection Time: 02/16/22  6:04 AM   Result Value Ref Range    WBC 19.77 (H) 3.80 - 10.50 K/uL    RBC 4.15 (L) 4.50 - 5.80 M/uL    Hemoglobin 12.2 (L) 13.7 - 17.5 g/dL    Hematocrit 36.8 (L) 40.1 - 51.0 %    MCV 88.7 83.0 - 98.0 fL    MCH 29.4 28.0 - 33.2 pg    MCHC 33.2 32.2 - 36.5 g/dL    RDW 11.9 11.6 - 14.4 %    Platelets 241 150 - 350 K/uL    MPV 9.7 9.4 - 12.4 fL    Differential Type Auto     nRBC 0.0 <=0.0 %    Immature Granulocytes 0.6 %    Neutrophils 89.9 %    Lymphocytes 3.5 %    Monocytes 5.9 %    Eosinophils 0.0 %    Basophils 0.1 %    Immature Granulocytes, Absolute 0.11 (H) 0.00 - 0.08 K/uL    Neutrophils, Absolute 17.78 (H) 1.70 - 7.00 K/uL    Lymphocytes, Absolute 0.70 (L) 1.20 - 3.50 K/uL    Monocytes, Absolute 1.16 (H) 0.30 - 1.00 K/uL    Eosinophils, Absolute 0.00 (L) 0.04 - 0.54 K/uL    Basophils, Absolute 0.02 0.01 - 0.10 K/uL   Comprehensive metabolic panel    Collection Time: 02/16/22  6:04 AM   Result Value Ref Range    Sodium 137 136 - 144 mEQ/L    Potassium 4.2 3.6 - 5.1 mEQ/L    Chloride 104 98 - 109 mEQ/L    CO2  24 22 - 32 mEQ/L    BUN 12 8 - 20 mg/dL    Creatinine 0.7 (L) 0.8 - 1.3 mg/dL    Glucose 145 (H) 70 - 99 mg/dL    Calcium 8.6 (L) 8.9 - 10.3 mg/dL    AST (SGOT) 27 15 - 41 IU/L    ALT (SGPT) 28 16 - 63 IU/L    Alkaline Phosphatase 59 35 - 126 IU/L    Total Protein 7.0 6.0 - 8.2 g/dL    Albumin 3.8 3.4 - 5.0 g/dL    Bilirubin, Total 0.6 0.3 - 1.2 mg/dL    eGFR >60.0 >=60.0 mL/min/1.73m*2    Anion Gap 9 3 - 15 mEQ/L   Magnesium    Collection Time: 02/16/22  6:04 AM   Result Value Ref Range    Magnesium 2.0 1.8 - 2.5 mg/dL     Physical Exam  GEN: Uncomfortable appearing  HENT: NC/AT, no deformity  NECK: supple, no lymphadenopathy  CARD: RRR, no rubs, murmur, gallops  RESP: CTA, no wheeze, rales, crackles  Gi: soft, NTND, +BS  EXT: no edema, pulses present  SKIN: warm, dry, no rash  NEURO: AAOx3, nonfocal     Assessment & Plan  No new Assessment & Plan notes have been filed under this hospital service since the last note was generated.  Service: Hospitalist    Anxiety  Assessment & Plan  Continue lexapro     * Maxillary hypoplasia  Assessment & Plan  S/p Maxillofacial surgery     CXR for airway check in AM  Dexamethasone taper  D5 with 1/2 NS IVF  Dilaudid PRN    Code status: Full Code      Acting as a scribe in the presence of Dr. Rajesh Hickey,   I have reviewed and agree with the above note.  Rajesh Vasquez MD  [id number].  2/17/2022  8:20 AM

## 2022-02-17 NOTE — PATIENT CARE CONFERENCE
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 Wire cutters at bedside. TX M-S    Participants:  advanced practice provider,pharmacy,,physical therapy,dietitian/nutrition services,physician,nursing,respiratory therapy,occupational therapy

## 2022-02-17 NOTE — PROGRESS NOTES
OMFS Progress Note    Pt alert in bed complaining of nasal crusting. Denies SOB or other complaints. Pain well controlled. One episode of nausea after dilaudid admin.    Afebrile, AVSS, Tolerating PO's without difficulty, voiding freely    Exam: Excellent facial symmetry and esthetics. Good lip competence. Mild facial edema. IMF intact with stable occlusion, Maxilla pink and well perfused.     Resp: CTA B/L  Heart: RRR, S1-S2,   No G/M/R  Abd: S/NT/ND, +BS, no guarding  Ext: No C/C/E, negative Hilario's sign  Neuro: Non focal, V3 decreased sensation as expected. Moves all 4 extremities    Facial Series: Reviewed, all hardware in good position with condyles seated in fossae.    A/P: Good post operative course, improving daily.    -OOB  -Pressure dressing removed  - will change meds to PO  - Consider D/C to home in AM

## 2022-02-17 NOTE — DISCHARGE SUMMARY
Inpatient Discharge Summary    BRIEF OVERVIEW  Admitting Provider:  H&P Notes 1/18/2022 to 2/17/2022         Date of Service   Author Author Type Status Note Type File Time    02/15/22 1209  Rajesh Vasquez MD Physician Signed H&P 02/17/22 0820          Attending Provider: Aleksandr Tracy DMD Attending phys phone: [phone]  Primary Care Physician at Discharge: Subhash Davis [phone]    Admission Date: 2/15/2022     Discharge Date: 2/17/2022    Primary Discharge Diagnosis  Maxillary hypoplasia    Secondary Discharge Diagnosis  Active Hospital Problems    Diagnosis Date Noted   • Anxiety 02/15/2022      Resolved Hospital Problems    Diagnosis Date Noted Date Resolved   • Maxillary hypoplasia 02/15/2022 02/17/2022       DETAILS OF HOSPITAL STAY    Operative Procedures Performed  Procedure(s):  [place], 1 Piece Turbinectomy, Septoplasty  BSSO    Consults:       Consult Orders During Admission:  IP CONSULT TO INTENSIVIST  IP CONSULT TO NUTRITION SERVICES     Procedures: Lefort I Osteotomy,Septoplasty, Inferior turbinectomy, Bilateral sagital split mandibular osteotomy.    Pertinent Test Results: N/A    Imaging  X-RAY FACIAL BONES 3+ VIEW    Result Date: 2/16/2022  IMPRESSION: See comment.    X-RAY CHEST 1 VIEW    Result Date: 2/16/2022  IMPRESSION: Elevation or eventration of the right hemidiaphragm.  Increased markings at the right lung base, possibly subsegmental atelectasis; please correlate clinically.          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 Required combined orthodontic and surgical correction.    Exam on Day of Discharge  Patient seen and examined on day of discharge.  Decreasing facial edema, IMF intact, Maxilla pink and well perfused. Excellent symmetry and facial esthetics.Tolerating PO intake and ambulating well.    Hospital  Course  Admitted for surgical correction of dentofacial deformity. Tolerated procedure well with uneventful PO course. Post op films showed good segment and hardware positions. Showed steady improvement, afebrile with pain well controlled, tolerating PO intake. D/C to home.     Discharge Orders     Medication List      START taking these medications    acetaminophen-codeine 120-12 mg/5 mL solution  Commonly known as: TYLENOL w/ CODEINE  Take 15 mL by mouth every 4 (four) hours as needed for moderate pain or severe pain for up to 5 days.  Dose: 15 mL     amoxicillin 400 mg/5 mL suspension  Commonly known as: AMOXIL  Take 10 mL (800 mg total) by mouth 2 (two) times a day for 7 days.  Dose: 800 mg     chlorhexidine 0.12 % solution  Commonly known as: PERIDEX  Swish and spit 15 mL 2 (two) times a day for 14 days.  Dose: 15 mL     ibuprofen 100 mg/5 mL suspension  Commonly known as: MOTRIN  Take 30 mL (600 mg total) by mouth every 6 (six) hours as needed for mild pain or moderate pain.  Dose: 600 mg     ondansetron 4 mg/5 mL solution  Commonly known as: ZOFRAN  Take 5 mL (4 mg total) by mouth 2 (two) times a day as needed for nausea or vomiting.  Dose: 4 mg        CONTINUE taking these medications    cetirizine 10 mg tablet  Commonly known as: ZyrTEC  Take 10 mg by mouth nightly.  Dose: 10 mg     escitalopram 10 mg tablet  Commonly known as: LEXAPRO  Take 10 mg by mouth daily.  Dose: 10 mg              Outpatient Follow-Ups  Encounter Information    This patient does not currently have any appointments scheduled.       Referrals:  No orders of the defined types were placed in this encounter.      Active Issues Requiring Follow-up  Issue: Jaws wired  What is Needed: Call for follow up appointment.       Test Results Pending at Discharge  Unresulted Labs (From admission, onward)             Start     Ordered    02/18/22 0600  CBC  Morning draw        Question:  Release to patient  Answer:  Immediate    02/17/22 7753     02/18/22 0600  Basic metabolic panel  Morning draw        Question:  Release to patient  Answer:  Immediate    02/17/22 1136                    Discharge Disposition  Disposition: Home   Destination:        Code Status at Discharge: Full Code  Physician Order for Life-Sustaining Treatment Document Status      No documents found

## 2022-02-17 NOTE — DISCHARGE INSTRUCTIONS
Keep wire cutters with you at all times    Clean nostrils as needed with Hydrogen peroxide and cotton swabs.    You may brush teeth and wires with a small child's toothbrush.    Rinse with Peridex rinse twice per day. You may rinse with salt water as often as you like.    No nose blowing.    Call for follow up appointment on this coming Monday in the [place] office [phone]

## 2022-02-18 VITALS
TEMPERATURE: 98.1 F | DIASTOLIC BLOOD PRESSURE: 72 MMHG | WEIGHT: 198 LBS | SYSTOLIC BLOOD PRESSURE: 133 MMHG | HEIGHT: 74 IN | RESPIRATION RATE: 18 BRPM | OXYGEN SATURATION: 97 % | BODY MASS INDEX: 25.41 KG/M2 | HEART RATE: 62 BPM

## 2022-02-18 LAB
ANION GAP SERPL CALC-SCNC: 10 MEQ/L (ref 3–15)
BUN SERPL-MCNC: 16 MG/DL (ref 8–20)
CALCIUM SERPL-MCNC: 9 MG/DL (ref 8.9–10.3)
CHLORIDE SERPL-SCNC: 105 MEQ/L (ref 98–109)
CO2 SERPL-SCNC: 26 MEQ/L (ref 22–32)
CREAT SERPL-MCNC: 0.7 MG/DL (ref 0.8–1.3)
ERYTHROCYTE [DISTWIDTH] IN BLOOD BY AUTOMATED COUNT: 11.9 % (ref 11.6–14.4)
GFR SERPL CREATININE-BSD FRML MDRD: >60 ML/MIN/1.73M*2
GLUCOSE SERPL-MCNC: 104 MG/DL (ref 70–99)
HCT VFR BLDCO AUTO: 37.2 % (ref 40.1–51)
HGB BLD-MCNC: 12.4 G/DL (ref 13.7–17.5)
MCH RBC QN AUTO: 29 PG (ref 28–33.2)
MCHC RBC AUTO-ENTMCNC: 33.3 G/DL (ref 32.2–36.5)
MCV RBC AUTO: 87.1 FL (ref 83–98)
PDW BLD AUTO: 10 FL (ref 9.4–12.4)
PLATELET # BLD AUTO: 282 K/UL (ref 150–350)
POTASSIUM SERPL-SCNC: 3.8 MEQ/L (ref 3.6–5.1)
RBC # BLD AUTO: 4.27 M/UL (ref 4.5–5.8)
SODIUM SERPL-SCNC: 141 MEQ/L (ref 136–144)
WBC # BLD AUTO: 15.72 K/UL (ref 3.8–10.5)

## 2022-02-18 PROCEDURE — 63600000 HC DRUGS/DETAIL CODE: Performed by: PHYSICIAN ASSISTANT

## 2022-02-18 PROCEDURE — 36415 COLL VENOUS BLD VENIPUNCTURE: CPT | Performed by: PHYSICIAN ASSISTANT

## 2022-02-18 PROCEDURE — 25800000 HC PHARMACY IV SOLUTIONS: Performed by: STUDENT IN AN ORGANIZED HEALTH CARE EDUCATION/TRAINING PROGRAM

## 2022-02-18 PROCEDURE — 85027 COMPLETE CBC AUTOMATED: CPT | Performed by: PHYSICIAN ASSISTANT

## 2022-02-18 PROCEDURE — 80048 BASIC METABOLIC PNL TOTAL CA: CPT | Performed by: PHYSICIAN ASSISTANT

## 2022-02-18 PROCEDURE — 63600000 HC DRUGS/DETAIL CODE: Performed by: STUDENT IN AN ORGANIZED HEALTH CARE EDUCATION/TRAINING PROGRAM

## 2022-02-18 RX ADMIN — AMPICILLIN SODIUM AND SULBACTAM SODIUM 3 G: 2; 1 INJECTION, POWDER, FOR SOLUTION INTRAMUSCULAR; INTRAVENOUS at 02:43

## 2022-02-18 RX ADMIN — SODIUM CHLORIDE, SODIUM LACTATE, POTASSIUM CHLORIDE, CALCIUM CHLORIDE AND DEXTROSE MONOHYDRATE: 5; 600; 310; 30; 20 INJECTION, SOLUTION INTRAVENOUS at 03:49

## 2022-02-18 NOTE — PROGRESS NOTES
Critical Care Progress Note    SUBJECTIVE  State his pain is adequately controlled  Denies respiratory distress  On clear liquid diet  Plan to transition pt to 4PAV      OBJECTIVE    Vitals:    02/17/22 1922   BP: 132/73   Pulse: 83   Resp: 18   Temp: 37.1 °C (98.7 °F)   SpO2: 98%         Intake/Output Summary (Last 24 hours) at 2/17/2022 1936  Last data filed at 2/17/2022 1551  Gross per 24 hour   Intake 1908 ml   Output 4450 ml   Net -2542 ml       FiO2 (%) (Set):  [40 %] 40 %    Lab Results   Component Value Date    GLUCOSE 145 (H) 02/16/2022    CALCIUM 8.6 (L) 02/16/2022     02/16/2022    K 4.2 02/16/2022    CO2 24 02/16/2022     02/16/2022    BUN 12 02/16/2022    CREATININE 0.7 (L) 02/16/2022       Lab Results   Component Value Date    WBC 19.77 (H) 02/16/2022    HGB 12.2 (L) 02/16/2022    HCT 36.8 (L) 02/16/2022    MCV 88.7 02/16/2022     02/16/2022       Lab Results   Component Value Date    MG 2.0 02/16/2022         Physical Exam:  GEN: NAD in bed  HEENT: AT/NC, EOM, PERRL, Neck supple w/ dressing about   Cv: RRR  Lungs: aearating b/l, no wheeze/rhonchi  ABD: Soft, nomroactive  EXT: symmetric  Pv: pulses intact  Neuro: Ao3, nonfocal  Skin: warm, dry    Labs  I have reviewed the patient's pertinent labs.     Imaging  I have independently reviewed the patient's pertinent imaging.     * Maxillary hypoplasia  Assessment & Plan  S/p Maxillofacial surgery    Post surgical management w/ [facility]  Post op abx, Unasyn course inpatient and transition to PO abx on d/c per [facility]  Have wire cutters at bedside  S/p Dexamethasone taper. monitor  Monitor respiratory status and airway  Dilaudid PRN  Suction PRN  OOB/ambulation  Diet, CLD and advance diet per [facility] recommendations  Appropriate dvt ppx      Anxiety  Assessment & Plan  Continue LIBIA Shelton Scribed for and in presence of DR Rajseh Vasquez  I have reviewed and agree with the above note.  Rajesh Vasquez MD   [id number]  2/18/2022  9:10 AM

## (undated) DEVICE — COTTONOIDS 1/2X3

## (undated) DEVICE — BURR ROUND CARBIDE 3.0MM

## (undated) DEVICE — ***USE 56895*** SUTURE CHROMIC GUT 3-0 636H

## (undated) DEVICE — NEEDLE SPINAL 25G X 3-1/2IN

## (undated) DEVICE — BLADE PRECISION THIN 7 X .38MM

## (undated) DEVICE — MAGNATRONCE 16INX20IN

## (undated) DEVICE — ***USE 56900*** SUTURE CHROMIC GUT 3-0 G122H

## (undated) DEVICE — GLOVE SZ 8 PROTEXIS PI

## (undated) DEVICE — BUR CUTTING TAPER SIDE 1.2MM

## (undated) DEVICE — TIP BOVIE NEEDLE COATED INSULATED

## (undated) DEVICE — BLADE OFFSET 42 DEG ANGLE 9.0 X .64 X 16.0MM

## (undated) DEVICE — TRAY CATH FOLEY ADD A FOLEY

## (undated) DEVICE — TIP SUCTION FRAZIER 12FR

## (undated) DEVICE — ***USE 33418*** SUTURE SILK 3-0 K832H 30IN SH

## (undated) DEVICE — BLADE SCALPEL #15

## (undated) DEVICE — MASTISOL LIQUID ADHESIVE

## (undated) DEVICE — BLADE SCALPEL #11

## (undated) DEVICE — TIP BOVIE COLORADO NEEDLE TIP

## (undated) DEVICE — PAD GROUND ELECTROSURGICAL W/CORD

## (undated) DEVICE — GAUZE VAGINAL 2X72 4PLY RFID XRAY

## (undated) DEVICE — BLADE PRECISION THIN 7.0 X .38MM

## (undated) DEVICE — GOWN SURGICAL REINFORCED X-LAR

## (undated) DEVICE — SUTURE PDS II  2-0 Z466H CP-1 27IN VIOLET

## (undated) DEVICE — ***USE 143206***SYRINGE BULB

## (undated) DEVICE — Device

## (undated) DEVICE — SYRINGE DISP LUER-LOK  3 CC

## (undated) DEVICE — PRECISION 9.0 X .51 X 39.0MM

## (undated) DEVICE — HEMOSTAT SURGICEL ABSORBABLE 4 X 8

## (undated) DEVICE — SUCTION 18FR FRAZIER DISPOSABLE

## (undated) DEVICE — TUBING SMOKE EVAC PENCIL COATED

## (undated) DEVICE — ***USE 57698*** SLEEVE FLOWTRON DVT CALF SINGLE USE

## (undated) DEVICE — GAUZE 8X4 12 PLY RFID DOUBLE XRAY

## (undated) DEVICE — TAPE MICROFOAM 1IN

## (undated) DEVICE — BAND FACIAL UNIVERSAL

## (undated) DEVICE — TIP SUCTION YANKAUER

## (undated) DEVICE — BIT TWIST DRILL 1.5MM DIA X 115MM W/NOTCH SINGLE USE

## (undated) DEVICE — BURR CARBIDE OVAL 4.0MM

## (undated) DEVICE — FLUFF GAUZE XX-LARGE 36X36 2PLY 4/PK

## (undated) DEVICE — PACK RFID ENT

## (undated) DEVICE — ***USE 56864*** SUTURE PROLENE  5-0 8698G

## (undated) DEVICE — ***USE 138483*** SUTURE CHROMIC GUT 4-0   635H

## (undated) DEVICE — COVER LIGHTHANDLE

## (undated) DEVICE — IMPLANTABLE DEVICE: Type: IMPLANTABLE DEVICE | Site: MAXILLA | Status: NON-FUNCTIONAL